# Patient Record
Sex: FEMALE | Race: BLACK OR AFRICAN AMERICAN | Employment: OTHER | URBAN - METROPOLITAN AREA
[De-identification: names, ages, dates, MRNs, and addresses within clinical notes are randomized per-mention and may not be internally consistent; named-entity substitution may affect disease eponyms.]

---

## 2018-09-12 ENCOUNTER — ED HISTORICAL/CONVERTED ENCOUNTER (OUTPATIENT)
Dept: OTHER | Age: 78
End: 2018-09-12

## 2020-12-22 ENCOUNTER — HOSPITAL ENCOUNTER (EMERGENCY)
Age: 80
Discharge: HOME | End: 2020-12-22
Attending: EMERGENCY MEDICINE
Payer: MEDICAID

## 2020-12-22 VITALS
DIASTOLIC BLOOD PRESSURE: 52 MMHG | SYSTOLIC BLOOD PRESSURE: 140 MMHG | OXYGEN SATURATION: 99 % | WEIGHT: 170 LBS | HEART RATE: 65 BPM | RESPIRATION RATE: 16 BRPM | TEMPERATURE: 96.8 F

## 2020-12-22 DIAGNOSIS — E87.6 HYPOKALEMIA: ICD-10-CM

## 2020-12-22 DIAGNOSIS — E16.2 HYPOGLYCEMIA: Primary | ICD-10-CM

## 2020-12-22 LAB
ALBUMIN SERPL-MCNC: 3.7 G/DL (ref 3.5–5)
ALBUMIN/GLOB SERPL: 1.2 {RATIO} (ref 1.1–2.2)
ALP SERPL-CCNC: 74 U/L (ref 45–117)
ALT SERPL-CCNC: 15 U/L (ref 12–78)
ANION GAP SERPL CALC-SCNC: 4 MMOL/L (ref 5–15)
AST SERPL-CCNC: 19 U/L (ref 15–37)
BASOPHILS # BLD: 0.1 K/UL (ref 0–0.1)
BASOPHILS NFR BLD: 1 % (ref 0–1)
BILIRUB SERPL-MCNC: 0.2 MG/DL (ref 0.2–1)
BUN SERPL-MCNC: 16 MG/DL (ref 6–20)
BUN/CREAT SERPL: 15 (ref 12–20)
CALCIUM SERPL-MCNC: 9 MG/DL (ref 8.5–10.1)
CHLORIDE SERPL-SCNC: 114 MMOL/L (ref 97–108)
CO2 SERPL-SCNC: 25 MMOL/L (ref 21–32)
COMMENT, HOLDF: NORMAL
CREAT SERPL-MCNC: 1.07 MG/DL (ref 0.55–1.02)
DIFFERENTIAL METHOD BLD: ABNORMAL
EOSINOPHIL # BLD: 0.1 K/UL (ref 0–0.4)
EOSINOPHIL NFR BLD: 2 % (ref 0–7)
ERYTHROCYTE [DISTWIDTH] IN BLOOD BY AUTOMATED COUNT: 13.7 % (ref 11.5–14.5)
GLOBULIN SER CALC-MCNC: 3.2 G/DL (ref 2–4)
GLUCOSE BLD STRIP.AUTO-MCNC: 49 MG/DL (ref 65–100)
GLUCOSE BLD STRIP.AUTO-MCNC: 50 MG/DL (ref 65–100)
GLUCOSE BLD STRIP.AUTO-MCNC: 67 MG/DL (ref 65–100)
GLUCOSE BLD STRIP.AUTO-MCNC: 81 MG/DL (ref 65–100)
GLUCOSE BLD STRIP.AUTO-MCNC: 84 MG/DL (ref 65–100)
GLUCOSE BLD STRIP.AUTO-MCNC: 93 MG/DL (ref 65–100)
GLUCOSE SERPL-MCNC: 42 MG/DL (ref 65–100)
HCT VFR BLD AUTO: 37.8 % (ref 35–47)
HGB BLD-MCNC: 12.2 G/DL (ref 11.5–16)
IMM GRANULOCYTES # BLD AUTO: 0.1 K/UL (ref 0–0.04)
IMM GRANULOCYTES NFR BLD AUTO: 1 % (ref 0–0.5)
LYMPHOCYTES # BLD: 2.9 K/UL (ref 0.8–3.5)
LYMPHOCYTES NFR BLD: 31 % (ref 12–49)
MCH RBC QN AUTO: 27.4 PG (ref 26–34)
MCHC RBC AUTO-ENTMCNC: 32.3 G/DL (ref 30–36.5)
MCV RBC AUTO: 84.9 FL (ref 80–99)
MONOCYTES # BLD: 0.8 K/UL (ref 0–1)
MONOCYTES NFR BLD: 8 % (ref 5–13)
NEUTS SEG # BLD: 5.4 K/UL (ref 1.8–8)
NEUTS SEG NFR BLD: 57 % (ref 32–75)
NRBC # BLD: 0 K/UL (ref 0–0.01)
NRBC BLD-RTO: 0 PER 100 WBC
PLATELET # BLD AUTO: 221 K/UL (ref 150–400)
PMV BLD AUTO: 9.5 FL (ref 8.9–12.9)
POTASSIUM SERPL-SCNC: 3.2 MMOL/L (ref 3.5–5.1)
PROT SERPL-MCNC: 6.9 G/DL (ref 6.4–8.2)
RBC # BLD AUTO: 4.45 M/UL (ref 3.8–5.2)
SAMPLES BEING HELD,HOLD: NORMAL
SERVICE CMNT-IMP: ABNORMAL
SERVICE CMNT-IMP: ABNORMAL
SERVICE CMNT-IMP: NORMAL
SODIUM SERPL-SCNC: 143 MMOL/L (ref 136–145)
WBC # BLD AUTO: 9.3 K/UL (ref 3.6–11)

## 2020-12-22 PROCEDURE — 85025 COMPLETE CBC W/AUTO DIFF WBC: CPT

## 2020-12-22 PROCEDURE — 74011250637 HC RX REV CODE- 250/637: Performed by: EMERGENCY MEDICINE

## 2020-12-22 PROCEDURE — 82962 GLUCOSE BLOOD TEST: CPT

## 2020-12-22 PROCEDURE — 36415 COLL VENOUS BLD VENIPUNCTURE: CPT

## 2020-12-22 PROCEDURE — 99285 EMERGENCY DEPT VISIT HI MDM: CPT

## 2020-12-22 PROCEDURE — 80053 COMPREHEN METABOLIC PANEL: CPT

## 2020-12-22 RX ORDER — GLIPIZIDE 10 MG/1
TABLET ORAL
COMMUNITY
End: 2021-03-08

## 2020-12-22 RX ORDER — POTASSIUM CHLORIDE 750 MG/1
40 TABLET, FILM COATED, EXTENDED RELEASE ORAL
Status: COMPLETED | OUTPATIENT
Start: 2020-12-22 | End: 2020-12-22

## 2020-12-22 RX ADMIN — POTASSIUM CHLORIDE 40 MEQ: 750 TABLET, EXTENDED RELEASE ORAL at 21:48

## 2020-12-22 NOTE — ED PROVIDER NOTES
72-year-old female history of diabetes presents to the emergency department today with chief complaint of low blood sugar. She typically runs in the 140s to 150s range. This morning her blood sugar was in the 40s and she came to the emergency department for further work-up. She feels generalized weakness. She endorses no changes in her diabetes medications. She tells me that she still is eating well without missing meals. She denies any recent illness with cough or fever or chest pain or shortness of breath. She has a history of cardiovascular disease and uses nitroglycerin daily. She has been drinking Coke at home and try to eat today to bring her blood sugar up. The history is provided by the patient and medical records. Low Blood Sugar   This is a new problem. Episode onset: This morning. The problem has not changed since onset. Associated symptoms include weakness. Pertinent negatives include no confusion, no somnolence, no seizures, no unresponsiveness, no agitation, no delusions, no hallucinations, no self-injury, no violence, no tingling and no numbness. No past medical history on file. No past surgical history on file. No family history on file.     Social History     Socioeconomic History    Marital status: Not on file     Spouse name: Not on file    Number of children: Not on file    Years of education: Not on file    Highest education level: Not on file   Occupational History    Not on file   Social Needs    Financial resource strain: Not on file    Food insecurity     Worry: Not on file     Inability: Not on file    Transportation needs     Medical: Not on file     Non-medical: Not on file   Tobacco Use    Smoking status: Not on file   Substance and Sexual Activity    Alcohol use: Not on file    Drug use: Not on file    Sexual activity: Not on file   Lifestyle    Physical activity     Days per week: Not on file     Minutes per session: Not on file    Stress: Not on file   Relationships    Social connections     Talks on phone: Not on file     Gets together: Not on file     Attends Worship service: Not on file     Active member of club or organization: Not on file     Attends meetings of clubs or organizations: Not on file     Relationship status: Not on file    Intimate partner violence     Fear of current or ex partner: Not on file     Emotionally abused: Not on file     Physically abused: Not on file     Forced sexual activity: Not on file   Other Topics Concern    Not on file   Social History Narrative    Not on file         ALLERGIES: Patient has no known allergies. Review of Systems   Constitutional: Positive for fatigue. Negative for fever. HENT: Negative for sneezing and sore throat. Respiratory: Negative for cough and shortness of breath. Cardiovascular: Negative for chest pain and leg swelling. Gastrointestinal: Negative for abdominal pain, diarrhea, nausea and vomiting. Genitourinary: Negative for difficulty urinating and dysuria. Musculoskeletal: Negative for arthralgias and myalgias. Skin: Negative for color change and rash. Neurological: Positive for weakness. Negative for tingling, seizures, numbness and headaches. Psychiatric/Behavioral: Negative for agitation, behavioral problems, confusion, hallucinations and self-injury. Vitals:    12/22/20 1812   BP: 132/66   Pulse: 65   Resp: 16   Temp: 96.8 °F (36 °C)   SpO2: 99%   Weight: 77.1 kg (170 lb)            Physical Exam  Vitals signs and nursing note reviewed. Constitutional:       General: She is not in acute distress. Appearance: Normal appearance. She is not ill-appearing, toxic-appearing or diaphoretic. HENT:      Head: Normocephalic and atraumatic. Nose: Nose normal.      Mouth/Throat:      Mouth: Mucous membranes are moist.      Pharynx: Oropharynx is clear. Eyes:      Extraocular Movements: Extraocular movements intact.       Conjunctiva/sclera: Conjunctivae normal.      Pupils: Pupils are equal, round, and reactive to light. Neck:      Musculoskeletal: Normal range of motion and neck supple. Cardiovascular:      Rate and Rhythm: Normal rate and regular rhythm. Pulses: Normal pulses. Heart sounds: Normal heart sounds. Pulmonary:      Effort: Pulmonary effort is normal. No respiratory distress. Breath sounds: Normal breath sounds. Abdominal:      General: There is no distension. Palpations: Abdomen is soft. Tenderness: There is no abdominal tenderness. There is no guarding or rebound. Musculoskeletal: Normal range of motion. General: No swelling, tenderness, deformity or signs of injury. Right lower leg: No edema. Left lower leg: No edema. Skin:     General: Skin is warm and dry. Capillary Refill: Capillary refill takes less than 2 seconds. Neurological:      General: No focal deficit present. Mental Status: She is alert and oriented to person, place, and time. Psychiatric:         Mood and Affect: Mood normal.         Behavior: Behavior normal.          MDM  Number of Diagnoses or Management Options  Diagnosis management comments: 51-year-old female on glipizide for diabetes presents to the emergency department as above with hypoglycemia. This corrected with p.o. glucose as well as eating a meal.  She does not have evidence of renal dysfunction or other reason for her hypoglycemia. After observation in the emergency department with multiple glucose checks her sugar stabilized and she was discharged home with instructions to follow-up with her primary care doctor.        Amount and/or Complexity of Data Reviewed  Clinical lab tests: reviewed           Procedures

## 2020-12-22 NOTE — ED TRIAGE NOTES
Low blood sugar this am at home. 51 in triage given 8 oz juice. Patient reports feeling ok and eating.

## 2020-12-22 NOTE — ED NOTES
6:13 PM  I have evaluated the patient as the Provider in Triage. I have reviewed Her vital signs and the triage nurse assessment. I have talked with the patient and any available family and advised that I am the provider in triage and have ordered the appropriate study to initiate their work up based on the clinical presentation during my assessment. I have advised that the patient will be accommodated in the Main ED as soon as possible. I have also requested to contact the triage nurse or myself immediately if the patient experiences any changes in their condition during this brief waiting period. 79-year-old female with noninsulin-dependent diabetes sent in the ED for low at home blood sugars. On she had to eat today was an egg salad sandwich and a Coca-Cola. Daily sugars are typically in the 150s, today at home it was 41. Glucose in triage was 49, patient given orange juice to drink. She denies dizziness, abdominal pain, nausea, or any medical complaints at this time.     HARSHAL Le

## 2020-12-22 NOTE — ED NOTES
Rechecked blood sugar. Result of 50. Patient alert and oriented. Asymptomatic. Patient given 8oz of orange juice. Will recheck sugar in 15 minutes.

## 2020-12-23 NOTE — ED NOTES
Report received from Shyanne moreno, [facility].  Assumed care of pt at this time

## 2020-12-23 NOTE — DISCHARGE INSTRUCTIONS
Please check your blood glucose as needed for any symptoms of hyper or hypoglycemia. Please make an appointment as soon as possible see your primary care doctor. Be sure to eat nutritious meals 3 times daily along with healthy snacks as needed.

## 2021-01-10 NOTE — PROGRESS NOTES
Angelique Smith  68 y.o. female  1940  [address] [facility] [address]  [id number]   [address]:    Telemedicine Progress Note  Maddison Vazquez MD       Encounter Date and Time: January 11, 2021 at 4:41 PM    Consent: Angelique Smith, who was seen by synchronous (real-time) audio-video technology, and/or her healthcare decision maker, is aware that this patient-initiated, Telehealth encounter on 1/11/2021 is a billable service, with coverage as determined by her insurance carrier. She is aware that she may receive a bill and has provided verbal consent to proceed: Yes. Chief Complaint   Patient presents with    Diabetes     History of Present Illness   Angelique Smith is a 68 y.o. female was evaluated by synchronous (real-time) audio-video technology from home, through a secure patient portal.    DM:  Was in the ER for low blood sugar. 12/22/20 went to [facility]. BS was 42. This was corrected by PO glucose. BS this AM was 147. Not sure what last A1C was. States her A1C has been good, but not sure what it was. Not on Metformin because she has some GI side effects and stomach aching. Has been on Januvia for about 2 years. Has appt with ophthalmologist coming up in March. Noted to have low potassium as well in ER. Hx of triple bypass, multiple caths. Requesting referral to Cardiology in our [employer] system. Normal -120/80. This morning a little high and states she is anxious that this is her first virtual call.      Review of Systems   ROS    Vitals/Objective:     General: alert, cooperative, no distress   Mental  status: mental status: alert, oriented to person, place, and time, normal mood, behavior, speech, dress, motor activity, and thought processes   Resp: resp: normal effort and no respiratory distress   Neuro: neuro: no gross deficits   Skin: skin: no discoloration or lesions of concern on visible areas   Due to this being a TeleHealth evaluation, many elements of the physical examination are unable to be assessed. Assessment and Plan:   Time-based coding, delete if not needed: I spent at least 40 minutes with this established patient, and >50% of the time was spent counseling and/or coordinating care regarding diabetes    1. Hypokalemia  - METABOLIC PANEL, COMPREHENSIVE; Future    2. Hypoglycemia  - METABOLIC PANEL, COMPREHENSIVE; Future    3. Elevated serum creatinine  - METABOLIC PANEL, COMPREHENSIVE; Future  - HEMOGLOBIN A1C WITH EAG; Future    4. Type 2 diabetes mellitus with other specified complication, unspecified whether long term insulin use (HCC)  - METABOLIC PANEL, COMPREHENSIVE; Future  - LIPID PANEL; Future  - HEMOGLOBIN A1C WITH EAG; Future  - REFERRAL TO OPHTHALMOLOGY  - MICROALBUMIN, UR, RAND W/ MICROALB/CREAT RATIO; Future    5. Coronary artery disease involving other coronary artery bypass graft without angina pectoris  - REFERRAL TO CARDIOLOGY    6. Secondary hypertension      Assessment/Plan: Hold glipizide for now until we can check A1C. Will call repeat lab. Will call [facility] to get prior records. Check your BP at home everyday and can send me a her home BPs by the end of the week. Will also refer to Cardiology given hx and to establish care.       Patient Screening for COVID-19:     1) Patient denies complaints of shortness of breath or difficulty breathing, sore throat, chills, fatigue, muscle aches, loss of taste or smell, or GI symptoms(nausea, vomiting or diarrhea): Yes    2) Patient denies complaints of cough or fever over 100F: Yes    3) Patient denies leaving the country in the past 14 days or any other recent travel: Yes    4) Patient denies being exposed to anyone with COVID-19 and has not been around any one that has been sick with COVID-19 like symptoms as listed above: Yes     5) Patient informed to wear mask when arriving for appointment: Yes        Time spent in direct conversation with the patient to include medical condition(s) discussed, assessment and treatment plan:       We discussed the expected course, resolution and complications of the diagnosis(es) in detail. Medication risks, benefits, costs, interactions, and alternatives were discussed as indicated. I advised her to contact the office if her condition worsens, changes or fails to improve as anticipated. She expressed understanding with the diagnosis(es) and plan. Patient understands that this encounter was a temporary measure, and the importance of further follow up and examination was emphasized. Patient verbalized understanding. Patient informed to follow up: Yessenia Neff Follow-up and Dispositions  ·   Return for Come in this week for labs and then follow up in 2 month for Medicare Wellness(has BP cuff at home). Electronically Signed: Haroon Motley MD    CPT Codes 07420-85318 for Established Patients may apply to this Telehealth Visit. POS code: 18. Modifier GT    Sekou Balderas is a 68 y.o. female who was evaluated by an audio-video encounter for concerns as above. Patient identification was verified prior to start of the visit. A caregiver was present when appropriate. Due to this being a TeleHealth encounter (During [place] [address] public health emergency), evaluation of the following organ systems was limited: Vitals/Constitutional/EENT/Resp/CV/GI//MS/Neuro/Skin/Heme-Lymph-Imm. Pursuant to the emergency declaration under the [facility], [address] waiver authority and the [employer] and [employer] General Act, this Virtual Visit was conducted, with patient's (and/or legal guardian's) consent, to reduce the patient's risk of exposure to COVID-19 and provide necessary medical care. Services were provided through a synchronous discussion virtually to substitute for in-person clinic visit. I was at home. The patient was at home.      History   Patients past medical, surgical and family histories were reviewed and updated. Past Medical History:   Diagnosis Date    Diabetes ([facility] [address]     Endocrine disease     Hypertension     S/P triple vessel bypass 2005     No past surgical history on file. No family history on file. Social History     Socioeconomic History    Marital status:      Spouse name: Not on file    Number of children: Not on file    Years of education: Not on file    Highest education level: Not on file   Occupational History    Not on file   Social Needs    Financial resource strain: Not on file    Food insecurity     Worry: Not on file     Inability: Not on file    Transportation needs     Medical: Not on file     Non-medical: Not on file   Tobacco Use    Smoking status: Not on file   Substance and Sexual Activity    Alcohol use: Not on file    Drug use: Not on file    Sexual activity: Not on file   Lifestyle    Physical activity     Days per week: Not on file     Minutes per session: Not on file    Stress: Not on file   Relationships    Social connections     Talks on phone: Not on file     Gets together: Not on file     Attends Taoism service: Not on file     Active member of club or organization: Not on file     Attends meetings of clubs or organizations: Not on file     Relationship status: Not on file    Intimate partner violence     Fear of current or ex partner: Not on file     Emotionally abused: Not on file     Physically abused: Not on file     Forced sexual activity: Not on file   Other Topics Concern    Not on file   Social History Narrative    Not on file     There is no problem list on file for this patient.          Current Medications/Allergies   Medications and Allergies reviewed:    Current Outpatient Medications   Medication Sig Dispense Refill    isosorbide mononitrate ER (IMDUR) 60 mg CR tablet 60 mg = 1 tab each dose, PO, daily, # 90 tab, 3 Refills, Pharmacy: [facility] [id number]      omeprazole (PRILOSEC) 20 mg capsule 20 mg = 1 CAP each dose, PO, daily, # 90 CAP, 3 Refills, Pharmacy: [facility] [address]      doxazosin (CARDURA) 1 mg tablet TAKE 1 TABLET BY MOUTH ONCE DAILY      isosorbide mononitrate ER (IMDUR) 60 mg CR tablet TAKE 1 TABLET BY MOUTH ONCE DAILY      rosuvastatin (CRESTOR) 40 mg tablet TAKE 1 TABLET BY MOUTH ONCE DAILY      Januvia 100 mg tablet TAKE 1 TABLET BY MOUTH ONCE DAILY      glipiZIDE (GLUCOTROL) 10 mg tablet Take  by mouth.  Unknown dose       No Known Allergies

## 2021-01-11 ENCOUNTER — VIRTUAL VISIT (OUTPATIENT)
Dept: FAMILY MEDICINE CLINIC | Age: 81
End: 2021-01-11
Payer: MEDICARE

## 2021-01-11 DIAGNOSIS — R79.89 ELEVATED SERUM CREATININE: ICD-10-CM

## 2021-01-11 DIAGNOSIS — E11.69 TYPE 2 DIABETES MELLITUS WITH OTHER SPECIFIED COMPLICATION, UNSPECIFIED WHETHER LONG TERM INSULIN USE (HCC): ICD-10-CM

## 2021-01-11 DIAGNOSIS — I25.810 CORONARY ARTERY DISEASE INVOLVING OTHER CORONARY ARTERY BYPASS GRAFT WITHOUT ANGINA PECTORIS: ICD-10-CM

## 2021-01-11 DIAGNOSIS — E87.6 HYPOKALEMIA: Primary | ICD-10-CM

## 2021-01-11 DIAGNOSIS — I15.9 SECONDARY HYPERTENSION: ICD-10-CM

## 2021-01-11 DIAGNOSIS — E16.2 HYPOGLYCEMIA: ICD-10-CM

## 2021-01-11 PROCEDURE — 99203 OFFICE O/P NEW LOW 30 MIN: CPT | Performed by: FAMILY MEDICINE

## 2021-01-11 RX ORDER — ISOSORBIDE MONONITRATE 60 MG/1
TABLET, EXTENDED RELEASE ORAL
COMMUNITY
Start: 2020-10-30 | End: 2021-01-25 | Stop reason: SDUPTHER

## 2021-01-11 RX ORDER — ROSUVASTATIN CALCIUM 40 MG/1
TABLET, COATED ORAL
COMMUNITY
Start: 2020-10-30 | End: 2021-01-25 | Stop reason: SDUPTHER

## 2021-01-11 RX ORDER — ISOSORBIDE MONONITRATE 60 MG/1
TABLET, EXTENDED RELEASE ORAL
COMMUNITY
Start: 2020-02-04 | End: 2021-01-25 | Stop reason: SDUPTHER

## 2021-01-11 RX ORDER — DOXAZOSIN 1 MG/1
TABLET ORAL
COMMUNITY
Start: 2020-11-16 | End: 2021-01-25 | Stop reason: SDUPTHER

## 2021-01-11 RX ORDER — SITAGLIPTIN 100 MG/1
TABLET, FILM COATED ORAL
COMMUNITY
Start: 2020-11-29 | End: 2021-01-14 | Stop reason: SDUPTHER

## 2021-01-11 RX ORDER — OMEPRAZOLE 20 MG/1
CAPSULE, DELAYED RELEASE ORAL
COMMUNITY
Start: 2020-02-04 | End: 2021-01-25 | Stop reason: SDUPTHER

## 2021-01-14 ENCOUNTER — TELEPHONE (OUTPATIENT)
Dept: FAMILY MEDICINE CLINIC | Age: 81
End: 2021-01-14

## 2021-01-14 NOTE — TELEPHONE ENCOUNTER
Patient was not inquiring about lab results. She wanted to schedule an appointment to get labs drawn. Appointment scheduled for tomorrow morning.

## 2021-01-14 NOTE — TELEPHONE ENCOUNTER
----- Message from Berl Model sent at 1/13/2021  5:00 PM EST -----  Regarding: /Refill  Contact: [phone]  Medication Refill    Caller (if not patient): N/A      Relationship of caller (if not patient): N/A      Best contact number(s): [phone]      Name of medication and dosage if known: Blood pressure medication x4, amlodipine, mosipril (?), genuva(?), cholesterol med, omeprazole 10mg, doxyicine(?) 1mg, atorvastatin, nitroglycerine (under the tongue pill) 0.4mg and patches 0.4mg\"      Is patient out of this medication (yes/no): Yes      Pharm[email or web address] listed in chart? (yes/no): Yes  Pharmacy phone number: [phone]      Details to clarify the request: pt stated she takes 8 medications. She stated that she threw away the bottles and she is not exactly sure of the names of her prescriptions, but that she needs them refilled.       [place]

## 2021-01-14 NOTE — TELEPHONE ENCOUNTER
----- Message from Edilberto Mederos sent at 1/13/2021  4:58 PM EST -----  Regarding: /Telephone  Contact: [phone]  General Message/Vendor Calls    Caller's first and last name:N/A      Reason for call: lab results from last year      Callback required yes/no and why:Yes, to discuss results. Best contact number(s):[phone]      Details to clarify the request: pt stated that she never received her lab results from last year and she would like to know what those results are. Pt could not remember the exact date, but stated it was \"2019 last year\".            Edilberto Mederos

## 2021-01-15 ENCOUNTER — LAB ONLY (OUTPATIENT)
Dept: FAMILY MEDICINE CLINIC | Age: 81
End: 2021-01-15

## 2021-01-15 DIAGNOSIS — E87.6 HYPOKALEMIA: ICD-10-CM

## 2021-01-15 DIAGNOSIS — E11.69 TYPE 2 DIABETES MELLITUS WITH OTHER SPECIFIED COMPLICATION, UNSPECIFIED WHETHER LONG TERM INSULIN USE (HCC): ICD-10-CM

## 2021-01-15 DIAGNOSIS — E16.2 HYPOGLYCEMIA: ICD-10-CM

## 2021-01-15 DIAGNOSIS — R79.89 ELEVATED SERUM CREATININE: ICD-10-CM

## 2021-01-15 RX ORDER — OMEPRAZOLE 20 MG/1
CAPSULE, DELAYED RELEASE ORAL
OUTPATIENT
Start: 2021-01-15

## 2021-01-15 RX ORDER — SITAGLIPTIN 100 MG/1
TABLET, FILM COATED ORAL
Qty: 30 TAB | Refills: 0 | Status: SHIPPED | OUTPATIENT
Start: 2021-01-15 | End: 2021-01-25 | Stop reason: SDUPTHER

## 2021-01-15 NOTE — TELEPHONE ENCOUNTER
Patient needs to come in for labs. Please help make an appt. 30 day supply given for Januvia. Needs appt in 3-4 days after labs collected to discuss diabetes and other concerns. This can be done virtually. Did not discuss omeprazole at appt. Need to discuss this medication prior to refill.

## 2021-01-16 LAB
ALBUMIN SERPL-MCNC: 3.8 G/DL (ref 3.5–5)
ALBUMIN/GLOB SERPL: 1.1 {RATIO} (ref 1.1–2.2)
ALP SERPL-CCNC: 96 U/L (ref 45–117)
ALT SERPL-CCNC: 18 U/L (ref 12–78)
ANION GAP SERPL CALC-SCNC: 7 MMOL/L (ref 5–15)
AST SERPL-CCNC: 16 U/L (ref 15–37)
BILIRUB SERPL-MCNC: 0.3 MG/DL (ref 0.2–1)
BUN SERPL-MCNC: 16 MG/DL (ref 6–20)
BUN/CREAT SERPL: 19 (ref 12–20)
CALCIUM SERPL-MCNC: 9.7 MG/DL (ref 8.5–10.1)
CHLORIDE SERPL-SCNC: 109 MMOL/L (ref 97–108)
CHOLEST SERPL-MCNC: 149 MG/DL
CO2 SERPL-SCNC: 25 MMOL/L (ref 21–32)
CREAT SERPL-MCNC: 0.85 MG/DL (ref 0.55–1.02)
CREAT UR-MCNC: 56 MG/DL
EST. AVERAGE GLUCOSE BLD GHB EST-MCNC: 160 MG/DL
GLOBULIN SER CALC-MCNC: 3.5 G/DL (ref 2–4)
GLUCOSE SERPL-MCNC: 196 MG/DL (ref 65–100)
HBA1C MFR BLD: 7.2 % (ref 4–5.6)
HDLC SERPL-MCNC: 53 MG/DL
HDLC SERPL: 2.8 {RATIO} (ref 0–5)
LDLC SERPL CALC-MCNC: 83.8 MG/DL (ref 0–100)
LIPID PROFILE,FLP: NORMAL
MICROALBUMIN UR-MCNC: 2.56 MG/DL
MICROALBUMIN/CREAT UR-RTO: 46 MG/G (ref 0–30)
POTASSIUM SERPL-SCNC: 4.4 MMOL/L (ref 3.5–5.1)
PROT SERPL-MCNC: 7.3 G/DL (ref 6.4–8.2)
SODIUM SERPL-SCNC: 141 MMOL/L (ref 136–145)
TRIGL SERPL-MCNC: 61 MG/DL (ref ?–150)
VLDLC SERPL CALC-MCNC: 12.2 MG/DL

## 2021-01-16 NOTE — PROGRESS NOTES
Your A1C is in good range. Please continue to hold you glipizide. You may not need this medication moving forward. You have some protein in your urine and I recommend medication like losartan or lisinopril to protect your kidneys. Please call to schedule an appt virtually to discuss options and let's recheck your A1C in 3-4 months to see how you are doing off of the glipizide.

## 2021-01-25 ENCOUNTER — TELEPHONE (OUTPATIENT)
Dept: FAMILY MEDICINE CLINIC | Age: 81
End: 2021-01-25

## 2021-01-25 DIAGNOSIS — I25.810 CORONARY ARTERY DISEASE INVOLVING OTHER CORONARY ARTERY BYPASS GRAFT WITHOUT ANGINA PECTORIS: ICD-10-CM

## 2021-01-25 DIAGNOSIS — I15.9 SECONDARY HYPERTENSION: ICD-10-CM

## 2021-01-25 DIAGNOSIS — E11.69 TYPE 2 DIABETES MELLITUS WITH OTHER SPECIFIED COMPLICATION, UNSPECIFIED WHETHER LONG TERM INSULIN USE (HCC): Primary | ICD-10-CM

## 2021-01-25 RX ORDER — ROSUVASTATIN CALCIUM 40 MG/1
TABLET, COATED ORAL
Qty: 60 TAB | Refills: 0 | Status: SHIPPED | OUTPATIENT
Start: 2021-01-25 | End: 2022-04-26 | Stop reason: SDUPTHER

## 2021-01-25 RX ORDER — SITAGLIPTIN 100 MG/1
TABLET, FILM COATED ORAL
Qty: 30 TAB | Refills: 0 | Status: SHIPPED | OUTPATIENT
Start: 2021-01-25 | End: 2021-03-08

## 2021-01-25 RX ORDER — ISOSORBIDE MONONITRATE 60 MG/1
TABLET, EXTENDED RELEASE ORAL
Qty: 30 TAB | Refills: 0 | Status: SHIPPED | OUTPATIENT
Start: 2021-01-25

## 2021-01-25 RX ORDER — OMEPRAZOLE 20 MG/1
CAPSULE, DELAYED RELEASE ORAL
Qty: 60 CAP | Refills: 0 | Status: SHIPPED | OUTPATIENT
Start: 2021-01-25 | End: 2022-01-08

## 2021-01-25 RX ORDER — GLIPIZIDE 10 MG/1
TABLET ORAL
Status: CANCELLED | OUTPATIENT
Start: 2021-01-25

## 2021-01-25 RX ORDER — DOXAZOSIN 1 MG/1
TABLET ORAL
Qty: 30 TAB | Refills: 0 | Status: SHIPPED | OUTPATIENT
Start: 2021-01-25 | End: 2021-09-23

## 2021-01-25 NOTE — TELEPHONE ENCOUNTER
----- Message from Kirsten Colon sent at 1/21/2021 12:25 PM EST -----  Regarding: /Refill    Medication Refill    Caller (if not patient): Self      Relationship of caller (if not patient): Self      Best contact number(s):[phone]      Name of medication and dosage if known: isosorbide mononitrate ER (IMDUR) 60 mg CR tablet,      Is patient out of this medication (yes/no): Yes      Pharmacy name: [place] [id number] listed in chart? (yes/no):Yes  Pharmacy phone number: [phone]      Details to clarify the request: Pt did not have a list of all other medications she needs. Pt misplaced her medication bag. Pt advised she needs new refills sent to [facility] for all her emergency.        Kirsten Colon

## 2021-01-26 NOTE — ADDENDUM NOTE
Addended by: Angeles Aguila on: 1/25/2021 08:11 PM     Modules accepted: Orders
Addended by: Bj Rick on: 1/25/2021 01:14 PM     Modules accepted: Orders
Detail Level: Zone

## 2021-01-28 NOTE — TELEPHONE ENCOUNTER
Message left on patient's VMB in regards to Dr. Jasper Maya message below. Told her to call back clinic to discuss.

## 2021-01-29 NOTE — TELEPHONE ENCOUNTER
Patient scheduled for:   Appointment Information   Name: Bibi Vasquez MRN: [id number]    Date: 2/8/2021 Status: [place]    Time: 10:15 AM Length: 27 [id number]   Visit Type: [facility] [[id number]] Copay: $0.00    Provider: Cyndie Power MD Department: [facility]    Referral #:   Referral Status:      Referring Provider:   Patient Type:      Notes: fuv to labs/med refills/STATE-[facility]/MY-CHART      Close enc    Thanks  [facility] S[facility]  [facility] Referral Coordinator

## 2021-02-08 ENCOUNTER — VIRTUAL VISIT (OUTPATIENT)
Dept: FAMILY MEDICINE CLINIC | Age: 81
End: 2021-02-08
Payer: MEDICARE

## 2021-02-08 DIAGNOSIS — I25.810 CORONARY ARTERY DISEASE INVOLVING OTHER CORONARY ARTERY BYPASS GRAFT WITHOUT ANGINA PECTORIS: ICD-10-CM

## 2021-02-08 DIAGNOSIS — E11.69 TYPE 2 DIABETES MELLITUS WITH OTHER SPECIFIED COMPLICATION, UNSPECIFIED WHETHER LONG TERM INSULIN USE (HCC): Primary | ICD-10-CM

## 2021-02-08 PROCEDURE — 3051F HG A1C>EQUAL 7.0%<8.0%: CPT | Performed by: FAMILY MEDICINE

## 2021-02-08 PROCEDURE — 99214 OFFICE O/P EST MOD 30 MIN: CPT | Performed by: FAMILY MEDICINE

## 2021-02-08 RX ORDER — AMLODIPINE BESYLATE 10 MG/1
10 TABLET ORAL DAILY
COMMUNITY
Start: 2021-01-22

## 2021-02-08 RX ORDER — FOSINOPIRL SODIUM 10 MG/1
10 TABLET ORAL DAILY
Qty: 90 TAB | Refills: 1 | Status: SHIPPED | OUTPATIENT
Start: 2021-02-08 | End: 2022-01-08

## 2021-02-08 NOTE — PROGRESS NOTES
Jayson Chan  68 y.o. female  1940  [address] [facility] [address]  [id number]   [address]:    Telemedicine Progress Note  Ila Gonazles MD       Encounter Date and Time: February 8, 2021 at 10:32 AM    Consent: Jayson Chan, who was seen by synchronous (real-time) audio-video technology, and/or her healthcare decision maker, is aware that this patient-initiated, Telehealth encounter on 2/8/2021 is a billable service, with coverage as determined by her insurance carrier. She is aware that she may receive a bill and has provided verbal consent to proceed: Yes. Chief Complaint   Patient presents with    Diabetes     History of Present Illness   Jayson Chan is a 68 y.o. female was evaluated by synchronous (real-time) audio-video technology from home, through a secure patient portal.    DM:  Check BS at home. 150-160s and up to 200s after meals. Usually takes Fosinopril and has been out of this for a week. HTN:  Was taking an OTC supplement and not sure what it is but states once she stopped this, her BPs improved.       Lab Results   Component Value Date/Time    Hemoglobin A1c 7.2 (H) 01/15/2021 09:19 AM    Microalbumin/Creat ratio (mg/g creat) 46 (H) 01/15/2021 03:08 PM    Microalbumin,urine random 2.56 01/15/2021 03:08 PM    LDL, calculated 83.8 01/15/2021 09:19 AM    Creatinine 0.85 01/15/2021 09:19 AM         POC Glucose last 24hrs:   No components found for: GLPOC      Review of Systems   ROS    Vitals/Objective:     General: alert, cooperative, no distress   Mental  status: mental status: alert, oriented to person, place, and time, normal mood, behavior, speech, dress, motor activity, and thought processes   Resp: resp: normal effort and no respiratory distress   Neuro: neuro: no gross deficits   Skin: skin: no discoloration or lesions of concern on visible areas   Due to this being a TeleHealth evaluation, many elements of the physical examination are unable to be assessed. Assessment and Plan:   Time-based coding, delete if not needed: I spent at least 25 minutes with this established patient, and >50% of the time was spent counseling and/or coordinating care regarding DM and medication change    1. Type 2 diabetes mellitus with other specified complication, unspecified whether long term insulin use (HCC)  - HEMOGLOBIN A1C WITH EAG; Future  - empagliflozin (JARDIANCE) 10 mg tablet; Take 1 Tab by mouth daily. Dispense: 90 Tab; Refill: 1      Assessment/Plan: Having issues with insurance with Jardiance. Given co-morbilities, will start Jardiance and recheck A1C in 3 months. Time spent in direct conversation with the patient to include medical condition(s) discussed, assessment and treatment plan:       We discussed the expected course, resolution and complications of the diagnosis(es) in detail. Medication risks, benefits, costs, interactions, and alternatives were discussed as indicated. I advised her to contact the office if her condition worsens, changes or fails to improve as anticipated. She expressed understanding with the diagnosis(es) and plan. Patient understands that this encounter was a temporary measure, and the importance of further follow up and examination was emphasized. Patient verbalized understanding. Patient informed to follow up: Kenny Bliss Follow-up and Dispositions  ·   Return in about 3 months (around 5/8/2021) for Medicare Wellness . Electronically Signed: Arcadio Butler MD    CPT Codes 91731-27229 for Established Patients may apply to this Telehealth Visit. POS code: 18. Modifier GT    Danielle Calderon is a 68 y.o. female who was evaluated by an audio-video encounter for concerns as above. Patient identification was verified prior to start of the visit. A caregiver was present when appropriate.  Due to this being a TeleHealth encounter (During EGK-85 public health emergency), evaluation of the following organ systems was limited: Vitals/Constitutional/EENT/Resp/CV/GI//MS/Neuro/Skin/Heme-Lymph-Imm. Pursuant to the emergency declaration under the [facility], [facility] waiver authority and the [employer] Act, this Virtual Visit was conducted, with patient's (and/or legal guardian's) consent, to reduce the patient's risk of exposure to COVID-19 and provide necessary medical care. Services were provided through a synchronous discussion virtually to substitute for in-person clinic visit. I was at home. The patient was at home. History   Patients past medical, surgical and family histories were reviewed and updated. Past Medical History:   Diagnosis Date    Diabetes ([facility] [address]     Endocrine disease     Hypertension     S/P triple vessel bypass 2005     No past surgical history on file. No family history on file. Social History     Tobacco Use    Smoking status: Not on file   Substance Use Topics    Alcohol use: Not on file    Drug use: Not on file     There is no problem list on file for this patient. Current Medications/Allergies   Medications and Allergies reviewed:    Current Outpatient Medications   Medication Sig Dispense Refill    fosinopriL (MONOPRIL) 10 mg tablet Take 1 Tab by mouth daily. 90 Tab 1    empagliflozin (JARDIANCE) 10 mg tablet Take 1 Tab by mouth daily.  90 Tab 1    amLODIPine (NORVASC) 10 mg tablet       doxazosin (CARDURA) 1 mg tablet TAKE 1 TABLET BY MOUTH ONCE DAILY 30 Tab 0    isosorbide mononitrate ER (IMDUR) 60 mg CR tablet 60 mg = 1 tab each dose, PO, daily, # 90 tab, 3 Refills, Pharmacy: [address] 30 Tab 0    Januvia 100 mg tablet TAKE 1 TABLET BY MOUTH ONCE DAILY 30 Tab 0    omeprazole (PRILOSEC) 20 mg capsule 20 mg = 1 CAP each dose, PO, daily, # 90 CAP, 3 Refills, Pharmacy: [address] 60 Cap 0    rosuvastatin (CRESTOR) 40 mg tablet TAKE 1 TABLET BY MOUTH ONCE DAILY 60 Tab 0    glipiZIDE (GLUCOTROL) 10 mg tablet Take  by mouth.  Unknown dose       No Known Allergies

## 2021-02-10 ENCOUNTER — TELEPHONE (OUTPATIENT)
Dept: FAMILY MEDICINE CLINIC | Age: 81
End: 2021-02-10

## 2021-02-10 ENCOUNTER — APPOINTMENT (OUTPATIENT)
Dept: GENERAL RADIOLOGY | Age: 81
End: 2021-02-10
Attending: EMERGENCY MEDICINE
Payer: COMMERCIAL

## 2021-02-10 ENCOUNTER — HOSPITAL ENCOUNTER (EMERGENCY)
Age: 81
Discharge: HOME | End: 2021-02-10
Attending: EMERGENCY MEDICINE | Admitting: EMERGENCY MEDICINE
Payer: COMMERCIAL

## 2021-02-10 VITALS
BODY MASS INDEX: 27.59 KG/M2 | HEART RATE: 72 BPM | SYSTOLIC BLOOD PRESSURE: 114 MMHG | WEIGHT: 161.6 LBS | DIASTOLIC BLOOD PRESSURE: 49 MMHG | RESPIRATION RATE: 18 BRPM | OXYGEN SATURATION: 96 % | TEMPERATURE: 96.9 F | HEIGHT: 64 IN

## 2021-02-10 DIAGNOSIS — R73.9 HYPERGLYCEMIA: ICD-10-CM

## 2021-02-10 DIAGNOSIS — R11.0 NAUSEA WITHOUT VOMITING: Primary | ICD-10-CM

## 2021-02-10 LAB
ALBUMIN SERPL-MCNC: 3.5 G/DL (ref 3.5–5)
ALBUMIN/GLOB SERPL: 0.9 {RATIO} (ref 1.1–2.2)
ALP SERPL-CCNC: 93 U/L (ref 45–117)
ALT SERPL-CCNC: 17 U/L (ref 12–78)
ANION GAP SERPL CALC-SCNC: 7 MMOL/L (ref 5–15)
APPEARANCE UR: CLEAR
AST SERPL-CCNC: 15 U/L (ref 15–37)
BACTERIA URNS QL MICRO: NEGATIVE /HPF
BASOPHILS # BLD: 0.1 K/UL (ref 0–0.1)
BASOPHILS NFR BLD: 1 % (ref 0–1)
BILIRUB SERPL-MCNC: 0.3 MG/DL (ref 0.2–1)
BILIRUB UR QL: NEGATIVE
BUN SERPL-MCNC: 17 MG/DL (ref 6–20)
BUN/CREAT SERPL: 16 (ref 12–20)
CALCIUM SERPL-MCNC: 9.1 MG/DL (ref 8.5–10.1)
CHLORIDE SERPL-SCNC: 102 MMOL/L (ref 97–108)
CO2 SERPL-SCNC: 26 MMOL/L (ref 21–32)
COLOR UR: ABNORMAL
COMMENT, HOLDF: NORMAL
COVID-19 RAPID TEST, COVR: NOT DETECTED
CREAT SERPL-MCNC: 1.06 MG/DL (ref 0.55–1.02)
CRP SERPL-MCNC: <0.29 MG/DL (ref 0–0.6)
DIFFERENTIAL METHOD BLD: NORMAL
EOSINOPHIL # BLD: 0.2 K/UL (ref 0–0.4)
EOSINOPHIL NFR BLD: 2 % (ref 0–7)
EPITH CASTS URNS QL MICRO: ABNORMAL /LPF
ERYTHROCYTE [DISTWIDTH] IN BLOOD BY AUTOMATED COUNT: 12.7 % (ref 11.5–14.5)
FERRITIN SERPL-MCNC: 61 NG/ML (ref 8–252)
FIBRINOGEN PPP-MCNC: 339 MG/DL (ref 200–475)
GLOBULIN SER CALC-MCNC: 3.7 G/DL (ref 2–4)
GLUCOSE BLD STRIP.AUTO-MCNC: 185 MG/DL (ref 65–100)
GLUCOSE SERPL-MCNC: 315 MG/DL (ref 65–100)
GLUCOSE UR STRIP.AUTO-MCNC: >1000 MG/DL
HCT VFR BLD AUTO: 39.8 % (ref 35–47)
HGB BLD-MCNC: 13.3 G/DL (ref 11.5–16)
HGB UR QL STRIP: NEGATIVE
HYALINE CASTS URNS QL MICRO: ABNORMAL /LPF (ref 0–5)
IMM GRANULOCYTES # BLD AUTO: 0 K/UL (ref 0–0.04)
IMM GRANULOCYTES NFR BLD AUTO: 0 % (ref 0–0.5)
KETONES UR QL STRIP.AUTO: NEGATIVE MG/DL
LACTATE SERPL-SCNC: 1.8 MMOL/L (ref 0.4–2)
LDH SERPL L TO P-CCNC: 173 U/L (ref 81–246)
LEUKOCYTE ESTERASE UR QL STRIP.AUTO: NEGATIVE
LIPASE SERPL-CCNC: 252 U/L (ref 73–393)
LYMPHOCYTES # BLD: 2.4 K/UL (ref 0.8–3.5)
LYMPHOCYTES NFR BLD: 23 % (ref 12–49)
MAGNESIUM SERPL-MCNC: 2.5 MG/DL (ref 1.6–2.4)
MCH RBC QN AUTO: 27.8 PG (ref 26–34)
MCHC RBC AUTO-ENTMCNC: 33.4 G/DL (ref 30–36.5)
MCV RBC AUTO: 83.1 FL (ref 80–99)
MONOCYTES # BLD: 0.7 K/UL (ref 0–1)
MONOCYTES NFR BLD: 7 % (ref 5–13)
NEUTS SEG # BLD: 7 K/UL (ref 1.8–8)
NEUTS SEG NFR BLD: 67 % (ref 32–75)
NITRITE UR QL STRIP.AUTO: NEGATIVE
NRBC # BLD: 0 K/UL (ref 0–0.01)
NRBC BLD-RTO: 0 PER 100 WBC
PH UR STRIP: 6 [PH] (ref 5–8)
PLATELET # BLD AUTO: 211 K/UL (ref 150–400)
PMV BLD AUTO: 9.8 FL (ref 8.9–12.9)
POTASSIUM SERPL-SCNC: 4.2 MMOL/L (ref 3.5–5.1)
PROCALCITONIN SERPL-MCNC: <0.05 NG/ML
PROT SERPL-MCNC: 7.2 G/DL (ref 6.4–8.2)
PROT UR STRIP-MCNC: NEGATIVE MG/DL
RBC # BLD AUTO: 4.79 M/UL (ref 3.8–5.2)
RBC #/AREA URNS HPF: ABNORMAL /HPF (ref 0–5)
SAMPLES BEING HELD,HOLD: NORMAL
SARS-COV-2, COV2: NORMAL
SERVICE CMNT-IMP: ABNORMAL
SODIUM SERPL-SCNC: 135 MMOL/L (ref 136–145)
SOURCE, COVRS: NORMAL
SP GR UR REFRACTOMETRY: <1.005 (ref 1–1.03)
TROPONIN I SERPL-MCNC: <0.05 NG/ML
TROPONIN I SERPL-MCNC: <0.05 NG/ML
UR CULT HOLD, URHOLD: NORMAL
UROBILINOGEN UR QL STRIP.AUTO: 0.2 EU/DL (ref 0.2–1)
WBC # BLD AUTO: 10.3 K/UL (ref 3.6–11)
WBC URNS QL MICRO: ABNORMAL /HPF (ref 0–4)

## 2021-02-10 PROCEDURE — 83735 ASSAY OF MAGNESIUM: CPT

## 2021-02-10 PROCEDURE — 87040 BLOOD CULTURE FOR BACTERIA: CPT

## 2021-02-10 PROCEDURE — 96374 THER/PROPH/DIAG INJ IV PUSH: CPT

## 2021-02-10 PROCEDURE — 82962 GLUCOSE BLOOD TEST: CPT

## 2021-02-10 PROCEDURE — 87635 SARS-COV-2 COVID-19 AMP PRB: CPT

## 2021-02-10 PROCEDURE — 71045 X-RAY EXAM CHEST 1 VIEW: CPT

## 2021-02-10 PROCEDURE — 80053 COMPREHEN METABOLIC PANEL: CPT

## 2021-02-10 PROCEDURE — 83690 ASSAY OF LIPASE: CPT

## 2021-02-10 PROCEDURE — 85025 COMPLETE CBC W/AUTO DIFF WBC: CPT

## 2021-02-10 PROCEDURE — 82728 ASSAY OF FERRITIN: CPT

## 2021-02-10 PROCEDURE — 83605 ASSAY OF LACTIC ACID: CPT

## 2021-02-10 PROCEDURE — 36415 COLL VENOUS BLD VENIPUNCTURE: CPT

## 2021-02-10 PROCEDURE — 86140 C-REACTIVE PROTEIN: CPT

## 2021-02-10 PROCEDURE — 83615 LACTATE (LD) (LDH) ENZYME: CPT

## 2021-02-10 PROCEDURE — 99285 EMERGENCY DEPT VISIT HI MDM: CPT

## 2021-02-10 PROCEDURE — 96361 HYDRATE IV INFUSION ADD-ON: CPT

## 2021-02-10 PROCEDURE — 84145 PROCALCITONIN (PCT): CPT

## 2021-02-10 PROCEDURE — 84484 ASSAY OF TROPONIN QUANT: CPT

## 2021-02-10 PROCEDURE — 93005 ELECTROCARDIOGRAM TRACING: CPT

## 2021-02-10 PROCEDURE — 85384 FIBRINOGEN ACTIVITY: CPT

## 2021-02-10 PROCEDURE — 81001 URINALYSIS AUTO W/SCOPE: CPT

## 2021-02-10 PROCEDURE — 74011250636 HC RX REV CODE- 250/636: Performed by: EMERGENCY MEDICINE

## 2021-02-10 RX ORDER — ONDANSETRON 4 MG/1
4 TABLET, ORALLY DISINTEGRATING ORAL
Qty: 20 TAB | Refills: 0 | Status: SHIPPED | OUTPATIENT
Start: 2021-02-10 | End: 2022-01-12

## 2021-02-10 RX ORDER — ONDANSETRON 2 MG/ML
4 INJECTION INTRAMUSCULAR; INTRAVENOUS
Status: COMPLETED | OUTPATIENT
Start: 2021-02-10 | End: 2021-02-10

## 2021-02-10 RX ADMIN — ONDANSETRON 4 MG: 2 INJECTION INTRAMUSCULAR; INTRAVENOUS at 13:26

## 2021-02-10 RX ADMIN — SODIUM CHLORIDE 500 ML: 9 INJECTION, SOLUTION INTRAVENOUS at 13:29

## 2021-02-10 RX ADMIN — SODIUM CHLORIDE 500 ML: 9 INJECTION, SOLUTION INTRAVENOUS at 15:27

## 2021-02-10 NOTE — ED PROVIDER NOTES
Patient is a 74-year-old female with past medical history significant for hypertension, diabetes and coronary artery disease status post triple-vessel bypass who presents to the emergency department for evaluation of nausea and general malaise that started this morning. She states she felt a bit off prior to getting up but then got up to check her blood pressures because she is had problems with hypertension in the past.  When she checked her blood pressure however she realized it was actually on the low side which is not normal for her. She states she is a heart patient but denies any chest pain or shortness of breath. Denies any known sick contacts. She states that she feels better now but just feels a bit \"flu-eligio\". She states she is not sure if this is important now or not but her primary care doctor did place her on a new diabetes medication about a week ago. Past Medical History:   Diagnosis Date    Diabetes ([facility] [address]     Endocrine disease     Hypertension     S/P triple vessel bypass 2005       No past surgical history on file. No family history on file.     Social History     Socioeconomic History    Marital status:      Spouse name: Not on file    Number of children: Not on file    Years of education: Not on file    Highest education level: Not on file   Occupational History    Not on file   Social Needs    Financial resource strain: Not on file    Food insecurity     Worry: Not on file     Inability: Not on file    Transportation needs     Medical: Not on file     Non-medical: Not on file   Tobacco Use    Smoking status: Not on file   Substance and Sexual Activity    Alcohol use: Not on file    Drug use: Not on file    Sexual activity: Not on file   Lifestyle    Physical activity     Days per week: Not on file     Minutes per session: Not on file    Stress: Not on file   Relationships    Social connections     Talks on phone: Not on file     Gets together: Not on file     Attends Zoroastrianism service: Not on file     Active member of club or organization: Not on file     Attends meetings of clubs or organizations: Not on file     Relationship status: Not on file    Intimate partner violence     Fear of current or ex partner: Not on file     Emotionally abused: Not on file     Physically abused: Not on file     Forced sexual activity: Not on file   Other Topics Concern    Not on file   Social History Narrative    Not on file         ALLERGIES: Patient has no known allergies. Review of Systems   Constitutional: Positive for activity change, chills and fatigue. Negative for diaphoresis and fever. HENT: Negative for drooling, facial swelling and trouble swallowing. Eyes: Negative for photophobia. Respiratory: Negative for chest tightness and shortness of breath. Cardiovascular: Negative for chest pain and leg swelling. Gastrointestinal: Positive for nausea. Negative for abdominal pain, anal bleeding, blood in stool, constipation, diarrhea and vomiting. Genitourinary: Negative for difficulty urinating. Musculoskeletal: Positive for myalgias. Negative for back pain. Skin: Negative for rash. Neurological: Positive for weakness (Generalized). Negative for seizures, syncope and headaches. Hematological: Does not bruise/bleed easily. Psychiatric/Behavioral: Negative. Vitals:    02/10/21 1202   BP: (!) 132/58   Pulse: 72   Resp: 18   Temp: 96.9 °F (36.1 °C)   SpO2: 98%   Weight: 73.3 kg (161 lb 9.6 oz)   Height: 5' 4\" (1.626 m)            Physical Exam  Vitals signs and nursing note reviewed. Constitutional:       Appearance: She is not toxic-appearing or diaphoretic. Comments: Pleasant, elderly   HENT:      Head: Atraumatic. Right Ear: Tympanic membrane, ear canal and external ear normal.      Left Ear: External ear normal.      Nose:      Comments: Mask in place  Eyes:      General: No scleral icterus.   Neck:      Musculoskeletal: Neck supple. Cardiovascular:      Rate and Rhythm: Normal rate and regular rhythm. Heart sounds: Murmur present. No friction rub. Pulmonary:      Effort: Pulmonary effort is normal. No respiratory distress. Breath sounds: Normal breath sounds. No stridor. No wheezing or rhonchi. Abdominal:      Palpations: Abdomen is soft. Tenderness: There is no abdominal tenderness. There is no guarding or rebound. Musculoskeletal:         General: No tenderness or deformity. Right lower leg: No edema. Left lower leg: No edema. Skin:     General: Skin is warm and dry. Findings: No rash. Neurological:      Mental Status: She is alert and oriented to person, place, and time. Psychiatric:         Mood and Affect: Mood normal.         Behavior: Behavior normal.         Thought Content: Thought content normal.         Judgment: Judgment normal.          [facility]  ED Course as of Feb 10 1520   Wed Feb 10, 2021   1325 EKG obtained at 1315 showing normal sinus rhythm, rate 63, normal intervals, inferior Q waves age undetermined, no prior EKG available for comparison. [JE]      ED Course User Index  [JE] Angie Rojo MD       Procedures    3:21 PM  Change of shift. Care of patient signed over to Dr. Michelle Bonilla. Bedside handoff complete. Awaiting rpt trop and ua.

## 2021-02-10 NOTE — ED TRIAGE NOTES
Pt reports this morning she woke up feeling nauseated and weak. She took her BP and it was lower than normal at 101/61 and 99/62. /58. Pt notes she was recently started on a new medication for diabetes 2 days ago. .

## 2021-02-10 NOTE — DISCHARGE INSTRUCTIONS
Follow closely with your primary care doctor and return to the emergency department for any new or worsening symptoms. Drink plenty of fluids.

## 2021-02-10 NOTE — TELEPHONE ENCOUNTER
----- Message from Sarai Woods sent at 2/10/2021 10:10 AM EST -----  Regarding: /Level One      Level 1/Escalated Issue      Caller's first and last name and relationship (if not the patient): Self      Best contact number(s):[phone]      What are the symptoms: Low blood pressure,feeling sick      Transfer successful - yes/no (include outcome): No    Transfer declined - yes/no (include reason): Yes, advised to tell pt go to ED. Was caller advised to seek appropriate level of care - yes/no: Yes      Details to clarify the request: Pt woke up sick this morning and checked blood pressure which was very low. Pt is getting readings this morning  of 101/58, 99/56, 108/61, 118/64, 106/58. Pt is feeling nauseous. Pt advised she started a new bp medication on Monday. Spoke with Kate Wilkinson at [facility] who advised if no available virtual appt to advise pt to seek appropriate medical care at ED.          Sarai Woods

## 2021-02-10 NOTE — ED NOTES
Care assumed from Dr. Janis Cotter, 71-year-old female presenting with complaints of fatigue, and nausea. Unremarkable lab work and imaging thus far, repeat troponin and UA pending, patient remains normotensive in no acute distress, disposition pending.

## 2021-02-10 NOTE — ED NOTES

## 2021-02-11 LAB
ATRIAL RATE: 63 BPM
CALCULATED P AXIS, ECG09: 59 DEGREES
CALCULATED R AXIS, ECG10: 12 DEGREES
CALCULATED T AXIS, ECG11: 73 DEGREES
DIAGNOSIS, 93000: NORMAL
P-R INTERVAL, ECG05: 186 MS
Q-T INTERVAL, ECG07: 386 MS
QRS DURATION, ECG06: 86 MS
QTC CALCULATION (BEZET), ECG08: 395 MS
VENTRICULAR RATE, ECG03: 63 BPM

## 2021-02-15 LAB
BACTERIA SPEC CULT: NORMAL
SERVICE CMNT-IMP: NORMAL

## 2021-02-16 ENCOUNTER — OFFICE VISIT (OUTPATIENT)
Dept: FAMILY MEDICINE CLINIC | Age: 81
End: 2021-02-16
Payer: MEDICARE

## 2021-02-16 VITALS
HEART RATE: 76 BPM | HEIGHT: 64 IN | DIASTOLIC BLOOD PRESSURE: 52 MMHG | SYSTOLIC BLOOD PRESSURE: 103 MMHG | WEIGHT: 161.4 LBS | BODY MASS INDEX: 27.55 KG/M2 | OXYGEN SATURATION: 95 % | RESPIRATION RATE: 16 BRPM | TEMPERATURE: 97.9 F

## 2021-02-16 DIAGNOSIS — R53.83 FATIGUE, UNSPECIFIED TYPE: Primary | ICD-10-CM

## 2021-02-16 DIAGNOSIS — I95.1 ORTHOSTATIC HYPOTENSION: ICD-10-CM

## 2021-02-16 DIAGNOSIS — I25.810 CORONARY ARTERY DISEASE INVOLVING OTHER CORONARY ARTERY BYPASS GRAFT WITHOUT ANGINA PECTORIS: ICD-10-CM

## 2021-02-16 LAB — TSH SERPL DL<=0.05 MIU/L-ACNC: 1.51 UIU/ML (ref 0.36–3.74)

## 2021-02-16 PROCEDURE — 99213 OFFICE O/P EST LOW 20 MIN: CPT | Performed by: FAMILY MEDICINE

## 2021-02-16 NOTE — PROGRESS NOTES
Nhan Belle is a 68 y.o. female who presents today for ED follow up. Pt was seen 2/10/21 for feeling \"off. \" was having chest pressure on and off at the time. Pt had negative Troponin. Pt denied any vomiting or diarrhea but was nauseous at the time. Pt reported that he BP is usually high but is now low. Pt has significant cardiac hx and followed at [facility] Hx triple bypass 2005, multiple caths. Most recent 10/2018 and was clean, 2 stents in 2009. Unsure of when her last ECHO was. Is trying to switch to someone in [facility] due to insurance, calling Dr. Rosmery Piña office today. Checking BG and usually in high 100s. Recently stared Mike. Upon  questioning does have some dizziness on standing but not every time        Data reviewed or ordered today:       Other problems include: There is no problem list on file for this patient. Medications:  Current Outpatient Medications   Medication Sig Dispense Refill    ondansetron (Zofran ODT) 4 mg disintegrating tablet 1 Tab by SubLINGual route every eight (8) hours as needed for Nausea or Vomiting. 20 Tab 0    amLODIPine (NORVASC) 10 mg tablet       fosinopriL (MONOPRIL) 10 mg tablet Take 1 Tab by mouth daily. 90 Tab 1    doxazosin (CARDURA) 1 mg tablet TAKE 1 TABLET BY MOUTH ONCE DAILY 30 Tab 0    isosorbide mononitrate ER (IMDUR) 60 mg CR tablet 60 mg = 1 tab each dose, PO, daily, # 90 tab, 3 Refills, Pharmacy: [address] 30 Tab 0    omeprazole (PRILOSEC) 20 mg capsule 20 mg = 1 CAP each dose, PO, daily, # 90 CAP, 3 Refills, Pharmacy: [address] 60 Cap 0    rosuvastatin (CRESTOR) 40 mg tablet TAKE 1 TABLET BY MOUTH ONCE DAILY 60 Tab 0    empagliflozin (JARDIANCE) 10 mg tablet Take 1 Tab by mouth daily. 90 Tab 1    Januvia 100 mg tablet TAKE 1 TABLET BY MOUTH ONCE DAILY 30 Tab 0    glipiZIDE (GLUCOTROL) 10 mg tablet Take  by mouth. Unknown dose         Allergies:  No Known Allergies    LMP:  No LMP recorded.  Patient is postmenopausal.    Social History     Socioeconomic History    Marital status:      Spouse name: Not on file    Number of children: Not on file    Years of education: Not on file    Highest education level: Not on file   Occupational History    Not on file   Social Needs    Financial resource strain: Not on file    Food insecurity     Worry: Not on file     Inability: Not on file    Transportation needs     Medical: Not on file     Non-medical: Not on file   Tobacco Use    Smoking status: Never Smoker    Smokeless tobacco: Never Used   Substance and Sexual Activity    Alcohol use: Not Currently     Frequency: Never    Drug use: Not on file    Sexual activity: Never   Lifestyle    Physical activity     Days per week: Not on file     Minutes per session: Not on file    Stress: Not on file   Relationships    Social connections     Talks on phone: Not on file     Gets together: Not on file     Attends Spiritism service: Not on file     Active member of club or organization: Not on file     Attends meetings of clubs or organizations: Not on file     Relationship status: Not on file    Intimate partner violence     Fear of current or ex partner: Not on file     Emotionally abused: Not on file     Physically abused: Not on file     Forced sexual activity: Not on file   Other Topics Concern    Not on file   Social History Narrative    Not on file       ROS:  Fever: no  Weight loss: no  Headaches:  no  Chest Pain:  no  SOB:  no  Cough: no  Other significant ROS:        Physical Exam  Visit Vitals  BP (!) 103/52 (BP 1 Location: Left upper arm, BP Patient Position: Sitting, BP Cuff Size: Adult)   Pulse 76   Temp 97.9 °F (36.6 °C) (Temporal)   Resp 16   Ht 5' 4\" (1.626 m)   Wt 161 lb 6.4 oz (73.2 kg)   SpO2 95%   BMI 27.70 kg/m²     Constitutional:  Appears well,  No acute distress, Vitals noted  Psychiatric:   Affect normal, Alert and Oriented to person/place/time  Eyes:   Pupils equally round and reactive, EOMI, conjunctiva clear  Neck:   General inspection and Thyroid normal.  No abnormal cervical or supraclavicular nodes. Lungs:   Clear to auscultation, good respiratory effort, no wheezes, rales or rhonchi  Heart:   Normal HR, Normal S1 and S2,  Regular rhythm. 2/6 systolic ejection murmur. No carotid bruits. Chest wall normal  Extremities:   without edema, good peripheral pulses  Skin:  Warm to palpation, without rashes, bruising, or suspicious lesions     BP   Layin/73 HR 69  Sitting 126/69 HR 72  Standing 117/68 HR 79     Assessment/Plan:      ICD-10-CM ICD-9-CM    1. Coronary artery disease involving other coronary artery bypass graft without angina pectoris  I25.810 414.05 ECHO ADULT COMPLETE   2. Fatigue, unspecified type  R53.83 780.79 ECHO ADULT COMPLETE      TSH 3RD GENERATION     1. Fatigue, unspecified type: lab work in ED without any significant abnormalities, given significant cardiac Hx concern that could be possible etiology. - ECHO ADULT COMPLETE; Future  - TSH 3RD GENERATION; Future    2. Coronary artery disease involving other coronary artery bypass graft without angina pectoris: given sxs would likely benefit from ECHO, Pt never told she has murmur    - ECHO ADULT COMPLETE; Future    3.  Orthostatic hypotension: likely 2/2 to medication, currently on norvasc, Cardura, and Imdur.   - discussed that she should hold Cardura if sxs return   - follow up cards      Orders Placed This Encounter    TSH 3RD GENERATION     Standing Status:   Future     Standing Expiration Date:   2022         Moose Rader MD   [facility] Medicine Res[facility]

## 2021-02-16 NOTE — PROGRESS NOTES
Chief Complaint   Patient presents with    Follow Up Chronic Condition     Patient presents to follow up on low blood pressure. Vitals:    02/16/21 0840   BP: (!) 103/52   BP 1 Location: Left upper arm   BP Patient Position: Sitting   BP Cuff Size: Adult   Pulse: 76   Resp: 16   Temp: 97.9 °F (36.6 °C)   TempSrc: Temporal   SpO2: 95%   Weight: 161 lb 6.4 oz (73.2 kg)   Height: 5' 4\" (1.626 m)     1. Have you been to the ER, urgent care clinic since your last visit? Hospitalized since your last visit? Yes, [facility] urgent care. 2. Have you seen or consulted any other health care providers outside of the [address] [place] [address] since your last visit? Include any pap smears or colon screening.  No

## 2021-03-01 ENCOUNTER — TELEPHONE (OUTPATIENT)
Dept: FAMILY MEDICINE CLINIC | Age: 81
End: 2021-03-01

## 2021-03-01 ENCOUNTER — HOSPITAL ENCOUNTER (OUTPATIENT)
Dept: NON INVASIVE DIAGNOSTICS | Age: 81
Discharge: HOME | End: 2021-03-01
Attending: FAMILY MEDICINE
Payer: MEDICARE

## 2021-03-01 VITALS
WEIGHT: 161 LBS | BODY MASS INDEX: 27.49 KG/M2 | DIASTOLIC BLOOD PRESSURE: 86 MMHG | HEIGHT: 64 IN | SYSTOLIC BLOOD PRESSURE: 129 MMHG

## 2021-03-01 DIAGNOSIS — R53.83 FATIGUE, UNSPECIFIED TYPE: ICD-10-CM

## 2021-03-01 DIAGNOSIS — I25.810 CORONARY ARTERY DISEASE INVOLVING OTHER CORONARY ARTERY BYPASS GRAFT WITHOUT ANGINA PECTORIS: ICD-10-CM

## 2021-03-01 LAB
ECHO AO ROOT DIAM: 3.19 CM
ECHO AV AREA PEAK VELOCITY: 1.85 CM2
ECHO AV AREA/BSA PEAK VELOCITY: 1 CM2/M2
ECHO AV PEAK GRADIENT: 6.87 MMHG
ECHO AV PEAK VELOCITY: 131.02 CM/S
ECHO LA AREA 4C: 16 CM2
ECHO LA MAJOR AXIS: 3.51 CM
ECHO LA MINOR AXIS: 1.97 CM
ECHO LA VOL 2C: 39.69 ML (ref 22–52)
ECHO LA VOL 4C: 45.03 ML (ref 22–52)
ECHO LA VOL BP: 46.18 ML (ref 22–52)
ECHO LA VOL/BSA BIPLANE: 25.89 ML/M2 (ref 16–28)
ECHO LA VOLUME INDEX A2C: 22.25 ML/M2 (ref 16–28)
ECHO LA VOLUME INDEX A4C: 25.24 ML/M2 (ref 16–28)
ECHO LV E' LATERAL VELOCITY: 10.51 CENTIMETER/SECOND
ECHO LV E' SEPTAL VELOCITY: 7.03 CENTIMETER/SECOND
ECHO LV INTERNAL DIMENSION DIASTOLIC: 4.48 CM (ref 3.9–5.3)
ECHO LV INTERNAL DIMENSION SYSTOLIC: 3.13 CM
ECHO LV IVSD: 0.91 CM (ref 0.6–0.9)
ECHO LV MASS 2D: 121.2 G (ref 67–162)
ECHO LV MASS INDEX 2D: 68 G/M2 (ref 43–95)
ECHO LV POSTERIOR WALL DIASTOLIC: 0.78 CM (ref 0.6–0.9)
ECHO LVOT DIAM: 1.75 CM
ECHO LVOT PEAK GRADIENT: 4.06 MMHG
ECHO LVOT PEAK VELOCITY: 100.69 CM/S
ECHO MV A VELOCITY: 79.77 CENTIMETER/SECOND
ECHO MV E DECELERATION TIME (DT): 247.75 MS
ECHO MV E VELOCITY: 60.75 CENTIMETER/SECOND
ECHO PV PEAK INSTANTANEOUS GRADIENT SYSTOLIC: 1.94 MMHG
ECHO PV REGURGITANT MAX VELOCITY: 69.69 CM/S
ECHO RV INTERNAL DIMENSION: 3.02 CM
ECHO RV TAPSE: 1.73 CM (ref 1.5–2)
ECHO TV REGURGITANT MAX VELOCITY: 247.4 CM/S
ECHO TV REGURGITANT PEAK GRADIENT: 24.48 MMHG
LA VOL DISK BP: 43.84 ML (ref 22–52)

## 2021-03-01 PROCEDURE — 93306 TTE W/DOPPLER COMPLETE: CPT

## 2021-03-01 NOTE — TELEPHONE ENCOUNTER
[phone]    Patient called to say this medication may not be agreeing with her as not feeling good on the inside. Some nausea and also making her nervous at times. She will drink Gatorade which seems to help.    empagliflozin (JARDIANCE) 10 mg tablet [    Asking for advice. Today at 1 pm is not a good time to call today as she has another appt at that time.

## 2021-03-05 NOTE — TELEPHONE ENCOUNTER
Already addressed. /Telephone--3/3/3/21    Message Contents   Jessie CANDELARIA [facility] Front Office               General Message/Vendor Calls     Caller's first and last name: Self     Reason for call: Pt requesting medical advise from a nurse     Callback required yes/no and why: Yes to assist with medical issue. Best contact number(s): [phone]     Details to clarify the request: Pt is following up on call she made regarding not feeling well in her body since she started taking the empagliflozin (JARDIANCE) 10 mg tablet. Pt is having a shaking feeling at times that makes her feel nervous like or like a panic attack.      Kirsten Colon

## 2021-03-08 ENCOUNTER — VIRTUAL VISIT (OUTPATIENT)
Dept: FAMILY MEDICINE CLINIC | Age: 81
End: 2021-03-08
Payer: MEDICARE

## 2021-03-08 DIAGNOSIS — R53.81 MALAISE: ICD-10-CM

## 2021-03-08 DIAGNOSIS — R11.0 NAUSEA: Primary | ICD-10-CM

## 2021-03-08 PROCEDURE — 99442 PR PHYS/QHP TELEPHONE EVALUATION 11-20 MIN: CPT | Performed by: STUDENT IN AN ORGANIZED HEALTH CARE EDUCATION/TRAINING PROGRAM

## 2021-03-08 RX ORDER — NITROGLYCERIN 80 MG/1
PATCH TRANSDERMAL
COMMUNITY
Start: 2020-03-16 | End: 2021-04-22 | Stop reason: SDUPTHER

## 2021-03-08 NOTE — PROGRESS NOTES
Tomy Gallagher  68 y.o. female  1940  [address] [place] [address]  [id number]    [phone] (home)      [address]:    Telephone Encounter  Aleksandar Beltran MD       Encounter Date: 3/8/2021 at 10:19 AM    Consent: Tomy Gallagher, who was seen by synchronous (real-time) audio only technology, and/or her healthcare decision maker, is aware that this patient-initiated, Telehealth encounter on 3/8/2021 is a billable service, with coverage as determined by her insurance carrier. She is aware that she may receive a bill and has provided verbal consent to proceed: Yes. Chief Complaint   Patient presents with    Nausea       History of Present Illness   Tomy Gallagher is a 68 y.o. female was evaluated by telephone. Patient has a history of Type II DM, CAD s/ triple bypass, HTN. She presents to discuss nausea. Reports nausea, general malaise, and jitteriness since starting Jardiance 10mg daily about three weeks ago. Occurs in the mornings; takes her meds at night. Drinking Gatorade Zero helps. BG low 200s and BPs have been 130s-150s/70s. BP now 118/70s. Seen by ED in February; labs normal and patient discharged with Zofran with mild relief. Appetite is ok. Noted to have hypoglycemia in the past and was told to hold her Glipizide. Had GI side effects on Metformin years agpo. A1c 7.2 in 1/2021. Seen in clinic last month for similar symptoms and was found to have orthostatic hypotension. Drinking 4 bottles of water daily and denies lightheadedness. Had echo done 3/1 showing EF 55-60 with mild pulmonic regurgitation. Seeing Dr. Clemencia Mcmahon on 3/22. Denies URI symptoms, chest pain, SOB, abdominal pain, vomiting, bloody stools, constipation, or diarrhea. Last BM two days ago. Review of Systems   Review of Systems   Constitutional: Negative for chills and fever. HENT: Negative for congestion and sore throat.     Eyes: Negative for blurred vision and double vision. Respiratory: Negative for cough and shortness of breath. Cardiovascular: Negative for chest pain and palpitations. Gastrointestinal: Positive for nausea. Negative for abdominal pain and vomiting. Genitourinary: Negative for dysuria and urgency. Musculoskeletal: Negative for joint pain and myalgias. Neurological: Negative for focal weakness and headaches. Vitals/Objective:   General: Patient speaking in complete sentences without effort. Normal speech and cooperative. Due to this being a Virtual Check-in/Telephone evaluation, many elements of the physical examination are unable to be assessed. Assessment and Plan: Total Time: minutes: 11-20 minutes      Nausea/malaise/jitteriness: Possibly related to starting Jardiance. Has previously tolerated Januvia, so will switch back to this. Has had issues with orthostatic hypotension, although majority of BP in normal range. Low threshold to decrease antihypertensives in the future if this persists. Given significant cardiac history, patient should keep Cardiology f/u to r/o cardiac etiology. -D/c Jardiance  -Start Januvia 100mg daily  -Monitor for hypoglycemia  -F/u with Cardiology     Patient informed to follow up: 1-2 weeks    I affirm this is a Patient Initiated Episode with an Established Patient who has not had a related appointment within my department in the past 7 days or scheduled within the next 24 hours. Note: not billable if this call serves to triage the patient into an appointment for the relevant concern      Electronically Signed: Lit Orta MD,  resident  Providers location when delivering service: home    CPT:  54109 (5-10 minutes)  [phone] (11-20 minutes)  21  (21-30 minutes)    Medicare:   - Virtual Check-in      ICD-10-CM ICD-9-CM    1. Nausea  R11.0 787.02    2.  Malaise  R53.81 780.79        Pursuant to the emergency declaration under the [address] [facility] authority and the Coronavirus Preparedness and [employer] Act, this Virtual  Visit was conducted, with patient's consent, to reduce the patient's risk of exposure to COVID-19 and provide continuity of care for an established patient. History   Patients past medical, surgical and family histories were personally reviewed and updated. Past Medical History:   Diagnosis Date    Diabetes ([address]     Endocrine disease     Hypertension     S/P triple vessel bypass 2005     No past surgical history on file. No family history on file. Social History     Tobacco Use    Smoking status: Never Smoker    Smokeless tobacco: Never Used   Substance Use Topics    Alcohol use: Not Currently     Frequency: Never    Drug use: Not on file              Current Medications/Allergies   Medications and Allergies reviewed:    Current Outpatient Medications   Medication Sig Dispense Refill    nitroglycerin (NITRODUR) 0.4 mg/hr = 1 patch each dose, Topical, daily, # 90 patch, 3 Refills, Pharmacy: [facility] [address]      SITagliptin (JANUVIA) 100 mg tablet Take 1 Tab by mouth daily. 60 Tab 0    ondansetron (Zofran ODT) 4 mg disintegrating tablet 1 Tab by SubLINGual route every eight (8) hours as needed for Nausea or Vomiting. 20 Tab 0    amLODIPine (NORVASC) 10 mg tablet       fosinopriL (MONOPRIL) 10 mg tablet Take 1 Tab by mouth daily. 90 Tab 1    empagliflozin (JARDIANCE) 10 mg tablet Take 1 Tab by mouth daily.  90 Tab 1    doxazosin (CARDURA) 1 mg tablet TAKE 1 TABLET BY MOUTH ONCE DAILY 30 Tab 0    isosorbide mononitrate ER (IMDUR) 60 mg CR tablet 60 mg = 1 tab each dose, PO, daily, # 90 tab, 3 Refills, Pharmacy: [facility] [address] 30 Tab 0    omeprazole (PRILOSEC) 20 mg capsule 20 mg = 1 CAP each dose, PO, daily, # 90 CAP, 3 Refills, Pharmacy: [facility] [address] 60 Cap 0    rosuvastatin (CRESTOR) 40 mg tablet TAKE 1 TABLET BY MOUTH ONCE DAILY 60 Tab 0     Allergies Allergen Reactions    Metformin Diarrhea

## 2021-03-09 NOTE — PROGRESS NOTES
[address] Medicine Residency Attending Addendum:  Dr. Sreekanth Eastman MD,  the patient and I were not physically present during this encounter. The resident and I are concurrently monitoring the patient care through appropriate telecommunication technology. I discussed the findings, assessment and plan with the resident and agree with the resident's findings and plan as documented in the resident's note.       Karley Hahn MD

## 2021-03-12 ENCOUNTER — TELEPHONE (OUTPATIENT)
Dept: FAMILY MEDICINE CLINIC | Age: 81
End: 2021-03-12

## 2021-03-12 NOTE — TELEPHONE ENCOUNTER
LVM that Januvia prior auth approved and medication should be available for pickup from her pharmacy today.

## 2021-03-19 ENCOUNTER — VIRTUAL VISIT (OUTPATIENT)
Dept: FAMILY MEDICINE CLINIC | Age: 81
End: 2021-03-19
Payer: MEDICARE

## 2021-03-19 DIAGNOSIS — R11.0 NAUSEA: ICD-10-CM

## 2021-03-19 DIAGNOSIS — E11.69 TYPE 2 DIABETES MELLITUS WITH OTHER SPECIFIED COMPLICATION, UNSPECIFIED WHETHER LONG TERM INSULIN USE (HCC): Primary | ICD-10-CM

## 2021-03-19 DIAGNOSIS — R53.81 MALAISE: ICD-10-CM

## 2021-03-19 PROCEDURE — 99442 PR PHYS/QHP TELEPHONE EVALUATION 11-20 MIN: CPT | Performed by: STUDENT IN AN ORGANIZED HEALTH CARE EDUCATION/TRAINING PROGRAM

## 2021-03-19 NOTE — PROGRESS NOTES
[address] Medicine Residency Attending Addendum:  Dr. Mehul Gomez MD,  the patient and I were not physically present during this encounter. The resident and I are concurrently monitoring the patient care through appropriate telecommunication technology. I discussed the findings, assessment and plan with the resident and agree with the resident's findings and plan as documented in the resident's note.       Bridger Branch MD

## 2021-03-19 NOTE — PROGRESS NOTES
Agueda Andino  68 y.o. female  1940  [address] [place] [address]  [id number]    [phone] (home)      [address]:    Telephone Encounter  Tg Santiago MD       Encounter Date: 3/19/2021 at 10:12 AM    Consent: Agueda Andino, who was seen by synchronous (real-time) audio only technology, and/or her healthcare decision maker, is aware that this patient-initiated, Telehealth encounter on 3/19/2021 is a billable service, with coverage as determined by her insurance carrier. She is aware that she may receive a bill and has provided verbal consent to proceed: Yes. Chief Complaint   Patient presents with    Nausea    Malaise    Diabetes       History of Present Illness   Agueda Andino is a 68 y.o. female was evaluated by telephone. Patient has a history of Type II DM, CAD s/p triple bypass, HTN. She presents to follow up nausea and general malaise. Noticed nausea and general malaise after starting Jardiance 10mg daily last month. Had been on Januvia 100mg daily (d/c due to insurance issues) along with Glipizide (d/c for hypoglycemia) for about 2 years and tolerated this well. During last visit, switched back to Januvia 100mg daily. States symptoms have resolved. Fasting BG have been 160-180s with one reading 224. Breakfast-low sugar cereal with apple, turkey sausage, egg,  Lunch- sandwich   Dinner-fish, turkey, chicken, vegetables   Eats unsweetened applesauce in between meals. Dilutes her apple juice. Limits sweets. Review of Systems   Review of Systems   Constitutional: Negative for chills and fever. HENT: Negative for congestion and sore throat. Eyes: Negative for blurred vision and double vision. Respiratory: Negative for cough and shortness of breath. Cardiovascular: Negative for chest pain and palpitations. Gastrointestinal: Negative for abdominal pain and nausea.    Genitourinary: Negative for dysuria and urgency. Musculoskeletal: Negative for joint pain and myalgias. Neurological: Negative for focal weakness and headaches. Vitals/Objective:   General: Patient speaking in complete sentences without effort. Normal speech and cooperative. Due to this being a Virtual Check-in/Telephone evaluation, many elements of the physical examination are unable to be assessed. Assessment and Plan: Total Time: minutes: 11-20 minutes     Type II DM: A1c at goal. Had nausea and malaise associated with Jardiance, however symptoms have now resolved since d/c'ing this medication.  -Continue Januvia 100mg daily   -F/u in 2 months     Patient informed to follow up: 2 months virtually    I affirm this is a Patient Initiated Episode with an Established Patient who has not had a related appointment within my department in the past 7 days or scheduled within the next 24 hours. Note: not billable if this call serves to triage the patient into an appointment for the relevant concern      Electronically Signed: Deborah Olguin MD,  resident  Providers location when delivering service: office    CPT:  05266 (5-10 minutes)  [phone] (11-20 minutes)  21  (21-30 minutes)    Medicare:  [address]      ICD-10-CM ICD-9-CM    1. Type 2 diabetes mellitus with other specified complication, unspecified whether long term insulin use (HCC)  E11.69 250.80    2. Nausea  R11.0 787.02    3. Malaise  R53.81 780.79        Pursuant to the emergency declaration under the [facility] waiver authority and the [employer] and [employer] Act, this Virtual  Visit was conducted, with patient's consent, to reduce the patient's risk of exposure to COVID-19 and provide continuity of care for an established patient. History   Patients past medical, surgical and family histories were personally reviewed and updated.       Past Medical History:   Diagnosis Date  Diabetes ([address]     Endocrine disease     Hypertension     S/P triple vessel bypass 2005     No past surgical history on file. No family history on file. Social History     Tobacco Use    Smoking status: Never Smoker    Smokeless tobacco: Never Used   Substance Use Topics    Alcohol use: Not Currently     Frequency: Never    Drug use: Not on file              Current Medications/Allergies   Medications and Allergies reviewed:    Current Outpatient Medications   Medication Sig Dispense Refill    nitroglycerin (NITRODUR) 0.4 mg/hr = 1 patch each dose, Topical, daily, # 90 patch, 3 Refills, Pharmacy: [address]      SITagliptin (JANUVIA) 100 mg tablet Take 1 Tab by mouth daily. 60 Tab 0    ondansetron (Zofran ODT) 4 mg disintegrating tablet 1 Tab by SubLINGual route every eight (8) hours as needed for Nausea or Vomiting. 20 Tab 0    amLODIPine (NORVASC) 10 mg tablet       fosinopriL (MONOPRIL) 10 mg tablet Take 1 Tab by mouth daily.  90 Tab 1    doxazosin (CARDURA) 1 mg tablet TAKE 1 TABLET BY MOUTH ONCE DAILY 30 Tab 0    isosorbide mononitrate ER (IMDUR) 60 mg CR tablet 60 mg = 1 tab each dose, PO, daily, # 90 tab, 3 Refills, Pharmacy: [address] 30 Tab 0    omeprazole (PRILOSEC) 20 mg capsule 20 mg = 1 CAP each dose, PO, daily, # 90 CAP, 3 Refills, Pharmacy: [address] 60 Cap 0    rosuvastatin (CRESTOR) 40 mg tablet TAKE 1 TABLET BY MOUTH ONCE DAILY 60 Tab 0     Allergies   Allergen Reactions    Metformin Diarrhea

## 2021-03-22 ENCOUNTER — OFFICE VISIT (OUTPATIENT)
Dept: CARDIOLOGY CLINIC | Age: 81
End: 2021-03-22
Payer: MEDICARE

## 2021-03-22 VITALS
HEART RATE: 55 BPM | WEIGHT: 156 LBS | DIASTOLIC BLOOD PRESSURE: 80 MMHG | HEIGHT: 64 IN | SYSTOLIC BLOOD PRESSURE: 151 MMHG | BODY MASS INDEX: 26.63 KG/M2 | OXYGEN SATURATION: 98 %

## 2021-03-22 DIAGNOSIS — I25.708 CORONARY ARTERY DISEASE OF BYPASS GRAFT OF NATIVE HEART WITH STABLE ANGINA PECTORIS (HCC): Primary | ICD-10-CM

## 2021-03-22 DIAGNOSIS — E78.5 DYSLIPIDEMIA: ICD-10-CM

## 2021-03-22 DIAGNOSIS — I10 ESSENTIAL HYPERTENSION: ICD-10-CM

## 2021-03-22 PROCEDURE — 1090F PRES/ABSN URINE INCON ASSESS: CPT | Performed by: SPECIALIST

## 2021-03-22 PROCEDURE — 99204 OFFICE O/P NEW MOD 45 MIN: CPT | Performed by: SPECIALIST

## 2021-03-22 PROCEDURE — G8754 DIAS BP LESS 90: HCPCS | Performed by: SPECIALIST

## 2021-03-22 PROCEDURE — G8753 SYS BP > OR = 140: HCPCS | Performed by: SPECIALIST

## 2021-03-22 RX ORDER — ASPIRIN 81 MG/1
81 TABLET ORAL DAILY
COMMUNITY

## 2021-03-22 NOTE — PROGRESS NOTES
Chief Complaint   Patient presents with    Coronary Artery Disease    Other     ORTHOSTATIC HYPO     Visit Vitals  BP (!) 151/80 (BP 1 Location: Left upper arm, BP Patient Position: Sitting)   Pulse (!) 55   Ht 5' 4\" (1.626 m)   Wt 156 lb (70.8 kg)   SpO2 98%   BMI 26.78 kg/m²     Chest pain denied   Palpations denied  SOB denied  Dizziness denied  Swelling in hands/feet denied   Recent hospital stays denied

## 2021-03-22 NOTE — PROGRESS NOTES
Autumn Ko MD. [facility]              Patient: Carolyn Romo  : 1940      Today's Date: 3/22/2021          HISTORY OF PRESENT ILLNESS:     History of Present Illness:  Here to establish a cardiology relationship (transferring from [facility]). She has a history of CAD/CABG. Was being seen at [facility] for insurance. No recent cardiac issues. No CP or SOB. No syncope. She feels well cardiac wise. BP OK at home. PAST MEDICAL HISTORY:     Past Medical History:   Diagnosis Date    CAD (coronary artery disease)     Diabetes ([address]     Endocrine disease     Hx of CABG     Hypertension     S/P triple vessel bypass            MEDICATIONS:     Current Outpatient Medications   Medication Sig Dispense Refill    aspirin delayed-release 81 mg tablet Take 81 mg by mouth daily.  nitroglycerin (NITRODUR) 0.4 mg/hr = 1 patch each dose, Topical, daily, # 90 patch, 3 Refills, Pharmacy: [address]      SITagliptin (JANUVIA) 100 mg tablet Take 1 Tab by mouth daily. 60 Tab 0    ondansetron (Zofran ODT) 4 mg disintegrating tablet 1 Tab by SubLINGual route every eight (8) hours as needed for Nausea or Vomiting. 20 Tab 0    amLODIPine (NORVASC) 10 mg tablet       fosinopriL (MONOPRIL) 10 mg tablet Take 1 Tab by mouth daily.  90 Tab 1    doxazosin (CARDURA) 1 mg tablet TAKE 1 TABLET BY MOUTH ONCE DAILY 30 Tab 0    isosorbide mononitrate ER (IMDUR) 60 mg CR tablet 60 mg = 1 tab each dose, PO, daily, # 90 tab, 3 Refills, Pharmacy: [address] 30 Tab 0    omeprazole (PRILOSEC) 20 mg capsule 20 mg = 1 CAP each dose, PO, daily, # 90 CAP, 3 Refills, Pharmacy: [address] 12 Cap 0    rosuvastatin (CRESTOR) 40 mg tablet TAKE 1 TABLET BY MOUTH ONCE DAILY 60 Tab 0       Allergies   Allergen Reactions    Jardiance [Empagliflozin] Nausea Only     HYPOTENSION    Metformin Diarrhea             SOCIAL HISTORY:     Social History     Tobacco Use    Smoking status: Never Smoker    Smokeless tobacco: Never Used   Substance Use Topics    Alcohol use: Not Currently     Frequency: Never    Drug use: Not on file         FAMILY HISTORY:     FH non-contributory         REVIEW OF SYMPTOMS:     Review of Symptoms:  Constitutional: Negative for fever, chills  HEENT: Negative for nosebleeds, tinnitus, and vision changes. Respiratory: Negative for cough, wheezing  Cardiovascular: Negative for orthopnea, claudication, syncope, and PND. Gastrointestinal: Negative for abdominal pain, diarrhea, melena. Genitourinary: Negative for dysuria  Musculoskeletal: Negative for myalgias. Skin: Negative for rash  Heme: No problems bleeding. Neurological: Negative for speech change and focal weakness. PHYSICAL EXAM:     Physical Exam:  Visit Vitals  BP (!) 151/80 (BP 1 Location: Left upper arm, BP Patient Position: Sitting)   Pulse (!) 55   Ht 5' 4\" (1.626 m)   Wt 156 lb (70.8 kg)   SpO2 98%   BMI 26.78 kg/m²     Patient appears generally well, mood and affect are appropriate and pleasant. HEENT:  Hearing intact, non-icteric, normocephalic, atraumatic. Neck Exam: Supple, No JVD or carotid bruits. Lung Exam: Clear to auscultation, even breath sounds. Cardiac Exam: Regular rate and rhythm with 2/6 systolic murmur  Abdomen: Soft, non-tender, normal bowel sounds. No bruits or masses. Extremities: Moves all ext well. No lower extremity edema. MSKTL: Overall good ROM ext  Skin: No significant rashes  Vascular: 2+ dorsalis pedis pulses bilaterally.   Psych: Appropriate affect  Neuro - Grossly intact      LABS / OTHER STUDIES reviewed:       Lab Results   Component Value Date/Time    Sodium 135 (L) 02/10/2021 01:18 PM    Potassium 4.2 02/10/2021 01:18 PM    Chloride 102 02/10/2021 01:18 PM    CO2 26 02/10/2021 01:18 PM    Anion gap 7 02/10/2021 01:18 PM    Glucose 315 (H) 02/10/2021 01:18 PM    BUN 17 02/10/2021 01:18 PM    Creatinine 1.06 (H) 02/10/2021 01:18 PM BUN/Creatinine ratio 16 02/10/2021 01:18 PM    GFR est AA >60 02/10/2021 01:18 PM    GFR est non-AA 50 (L) 02/10/2021 01:18 PM    Calcium 9.1 02/10/2021 01:18 PM    Bilirubin, total 0.3 02/10/2021 01:18 PM    Alk. phosphatase 93 02/10/2021 01:18 PM    Protein, total 7.2 02/10/2021 01:18 PM    Albumin 3.5 02/10/2021 01:18 PM    Globulin 3.7 02/10/2021 01:18 PM    A-G Ratio 0.9 (L) 02/10/2021 01:18 PM    ALT (SGPT) 17 02/10/2021 01:18 PM    AST (SGOT) 15 02/10/2021 01:18 PM     Lab Results   Component Value Date/Time    WBC 10.3 02/10/2021 01:18 PM    HGB 13.3 02/10/2021 01:18 PM    HCT 39.8 02/10/2021 01:18 PM    PLATELET 316  01:18 PM    MCV 83.1 02/10/2021 01:18 PM       Lab Results   Component Value Date/Time    Cholesterol, total 149 01/15/2021 09:19 AM    HDL Cholesterol 53 01/15/2021 09:19 AM    LDL, calculated 83.8 01/15/2021 09:19 AM    VLDL, calculated 12.2 01/15/2021 09:19 AM    Triglyceride 61 01/15/2021 09:19 AM    CHOL/HDL Ratio 2.8 01/15/2021 09:19 AM       Lab Results   Component Value Date/Time    TSH 1.51 2021 11:00 AM             CARDIAC DIAGNOSTICS:     Cardiac Evaluation Includes:  I reviewed the results below. Cath 2018 ([facility]) - no intervention needed - vessels looked OK per patient     EKG 2/10/21 - NSR, old inferior infarct - I viewed myself       ASSESSMENT AND PLAN:     Assessment and Plan:  1) CAD  - History of CABG (was managed at [facility] for years)   - She feels well without angina. - will get [facility] data to review   - cont statin, ASA    2) HTN  - BP up today - but she says better at home - 106/70 or so at home ---> follow at home and continue current meds     3) Dyslipidemia   - recent LDL OK   - cont statin     4) See me back in 6 months (her preference)     Lives with . Has 3 kids. She is a .           Praveena Reyes MD, [place] [address]  [address] [place] [address], [address]  [address] [facility] [address], Angie Miller  [place], [address] [place] [address]  Ph: [phone]   Ph [phone]        ADDENDUM   3/24/2021  [facility] records reviewed. She was seen at [facility] for CAD (s/p CABG; 2 stents to LCX in 2009)     CABG 5/2005 --> LIMA to LAD, SVG to Cx and PDA    Echo 6/17 - LVEF 50-55%    Lexiscan Cardiolite 8/14/18 - small sized (4%) reversible inferoseptal defect. LVEF 67%    Chest CTA 9/14/18 - no evidence acute traumatic injury. Right sided aortic arch.      Cardiac Cath ([facility]) 10/18 - patent grafts (LIMA to LAD, SVG-T to PDA and OM1) - treated medically - if persistent symptoms then consider PCI to pLAD and mLCX

## 2021-04-02 RX ORDER — NITROGLYCERIN 80 MG/1
PATCH TRANSDERMAL
Status: CANCELLED | OUTPATIENT
Start: 2021-04-02

## 2021-04-02 NOTE — TELEPHONE ENCOUNTER
Received x2 faxes from [facility] in [facility]. Called to confirm medication type, unable to LVM. Refill per VO of Dr. Spear Salts:  Last appt: 3/22/2021  Future Appointments   Date Time Provider Maikol Capps   2021 11:00 AM Emma Muñoz MD [facility] AMB       Requested Prescriptions     Signed Prescriptions Disp Refills    nitroglycerin (NITRODUR) 0.4 mg/hr 90 Patch 3     Si Patch by TransDERmal route daily.      Authorizing Provider: Pema Doe     Ordering User: Farzad Capone

## 2021-04-22 RX ORDER — NITROGLYCERIN 80 MG/1
1 PATCH TRANSDERMAL DAILY
Qty: 90 PATCH | Refills: 3 | Status: SHIPPED | OUTPATIENT
Start: 2021-04-22 | End: 2022-05-17 | Stop reason: SDUPTHER

## 2021-05-17 ENCOUNTER — LAB ONLY (OUTPATIENT)
Dept: FAMILY MEDICINE CLINIC | Age: 81
End: 2021-05-17

## 2021-05-17 DIAGNOSIS — E11.69 TYPE 2 DIABETES MELLITUS WITH OTHER SPECIFIED COMPLICATION, UNSPECIFIED WHETHER LONG TERM INSULIN USE (HCC): ICD-10-CM

## 2021-05-17 LAB
EST. AVERAGE GLUCOSE BLD GHB EST-MCNC: 192 MG/DL
HBA1C MFR BLD: 8.3 % (ref 4–5.6)

## 2021-05-18 NOTE — PROGRESS NOTES
Her A1c has increased. Her goal is less than 8. I see you recently saw Dr. Anatoliy Rincon. We may need to make adjustments to medications depending on how long you have been on the Januvia. Please schedule a follow-up appointment to discuss medication changes.

## 2021-06-08 ENCOUNTER — TELEPHONE (OUTPATIENT)
Dept: FAMILY MEDICINE CLINIC | Age: 81
End: 2021-06-08

## 2021-06-08 DIAGNOSIS — E11.69 TYPE 2 DIABETES MELLITUS WITH OTHER SPECIFIED COMPLICATION, UNSPECIFIED WHETHER LONG TERM INSULIN USE (HCC): Primary | ICD-10-CM

## 2021-06-08 NOTE — TELEPHONE ENCOUNTER
----- Message from Darlin Vasquez sent at 6/8/2021  2:18 PM EDT -----  Regarding: Dr. Blu Morton: [phone]  General Message/Vendor Calls    Caller's first and last name: N/A      Reason for call: Podiatrist       Callback required yes/no and why: Yes/Confirm      Best contact number(s):[phone]      Details to clarify the request: Patient is looking for a name of a podiatrist to clip her toe nails.         Darlin Vasquez

## 2021-06-11 NOTE — TELEPHONE ENCOUNTER
Updated Podiatry ref order and routed info over to office for scheduling    Close enc    Thanks  Efrain Finch S/PSR  [facility] Referral Coordinator

## 2021-09-07 ENCOUNTER — OFFICE VISIT (OUTPATIENT)
Dept: FAMILY MEDICINE CLINIC | Age: 81
End: 2021-09-07
Payer: MEDICARE

## 2021-09-07 VITALS — SYSTOLIC BLOOD PRESSURE: 140 MMHG | DIASTOLIC BLOOD PRESSURE: 58 MMHG

## 2021-09-07 DIAGNOSIS — E11.69 TYPE 2 DIABETES MELLITUS WITH OTHER SPECIFIED COMPLICATION, UNSPECIFIED WHETHER LONG TERM INSULIN USE (HCC): Primary | ICD-10-CM

## 2021-09-07 PROBLEM — Z95.5 PRESENCE OF CORONARY ANGIOPLASTY IMPLANT AND GRAFT: Status: ACTIVE | Noted: 2021-09-07

## 2021-09-07 PROBLEM — K21.9 GASTROESOPHAGEAL REFLUX DISEASE: Status: ACTIVE | Noted: 2021-09-07

## 2021-09-07 PROBLEM — J30.9 ALLERGIC RHINITIS: Status: ACTIVE | Noted: 2021-09-07

## 2021-09-07 PROCEDURE — 3051F HG A1C>EQUAL 7.0%<8.0%: CPT | Performed by: STUDENT IN AN ORGANIZED HEALTH CARE EDUCATION/TRAINING PROGRAM

## 2021-09-07 PROCEDURE — G8400 PT W/DXA NO RESULTS DOC: HCPCS | Performed by: STUDENT IN AN ORGANIZED HEALTH CARE EDUCATION/TRAINING PROGRAM

## 2021-09-07 PROCEDURE — G8432 DEP SCR NOT DOC, RNG: HCPCS | Performed by: STUDENT IN AN ORGANIZED HEALTH CARE EDUCATION/TRAINING PROGRAM

## 2021-09-07 PROCEDURE — 1101F PT FALLS ASSESS-DOCD LE1/YR: CPT | Performed by: STUDENT IN AN ORGANIZED HEALTH CARE EDUCATION/TRAINING PROGRAM

## 2021-09-07 PROCEDURE — G8427 DOCREV CUR MEDS BY ELIG CLIN: HCPCS | Performed by: STUDENT IN AN ORGANIZED HEALTH CARE EDUCATION/TRAINING PROGRAM

## 2021-09-07 PROCEDURE — G8419 CALC BMI OUT NRM PARAM NOF/U: HCPCS | Performed by: STUDENT IN AN ORGANIZED HEALTH CARE EDUCATION/TRAINING PROGRAM

## 2021-09-07 PROCEDURE — G8536 NO DOC ELDER MAL SCRN: HCPCS | Performed by: STUDENT IN AN ORGANIZED HEALTH CARE EDUCATION/TRAINING PROGRAM

## 2021-09-07 PROCEDURE — 99213 OFFICE O/P EST LOW 20 MIN: CPT | Performed by: STUDENT IN AN ORGANIZED HEALTH CARE EDUCATION/TRAINING PROGRAM

## 2021-09-07 PROCEDURE — G8753 SYS BP > OR = 140: HCPCS | Performed by: STUDENT IN AN ORGANIZED HEALTH CARE EDUCATION/TRAINING PROGRAM

## 2021-09-07 PROCEDURE — 1090F PRES/ABSN URINE INCON ASSESS: CPT | Performed by: STUDENT IN AN ORGANIZED HEALTH CARE EDUCATION/TRAINING PROGRAM

## 2021-09-07 PROCEDURE — G8754 DIAS BP LESS 90: HCPCS | Performed by: STUDENT IN AN ORGANIZED HEALTH CARE EDUCATION/TRAINING PROGRAM

## 2021-09-07 NOTE — PROGRESS NOTES
[address] [facility] [address] [place] [address]   Office [phone]  Fax [phone]     9/7/2021   Chief Complaint   Patient presents with    Diabetes     Patient presents for a diabetes follow up. HPI:  Dennie Buba is a 68 y.o. female who presents to clinic today for diabetes follow up. DM: On Januvia 100mg daily. This AM BG 89, but usually between 100-150. Follows with eye doctor yearly, last seen this spring. Drinking sparkling water, soda/ginger ale occasionally. Eating sweets once per week. Trying to improve diet. Eating fresh vegetables and cooking with olive oil. Could not tolerate metformin due to GI upset. Not tolerate Jardiance/epagliflozin due to nausea and feeling sick, which resulted in ED visit. Reports having pneumococcal vaccine twice, but does not have records. Patients past medical, surgical and family histories were reviewed. Allergies and Medications reviewed and updated. Review of Systems   Respiratory: Negative for sputum production. Gastrointestinal: Negative for abdominal pain. Neurological: Negative for tingling and sensory change. Objective:  Vitals:    09/07/21 1424   BP: (!) 140/58     There is no height or weight on file to calculate BMI. Physical Exam  General: Patient alert and oriented and in NAD  HEENT: PER/EOMI, no conjunctival pallor or scleral icterus. Heart: Regular rate and rhythm, No murmurs, rubs or gallops.   2+ peripheral pulses  Lungs: Clear to auscultation bilaterally, no wheezing, rales or rhonchi  Abd: +BS, non-tender, non-distended  Ext: No edema  Skin: No rashes or lesions noted on exposed skin,  Psych: Appropriate mood and affect  Diabetic foot exam:      Monofilament test:  good  Skin integrity:  intact without lesions  Vascular:  good circulation  Motor:  moves all toes, strength seems ok      Reviewed labs:   Lab Results   Component Value Date/Time    Hemoglobin A1c 8.3 (H) 05/17/2021 11:38 AM Assessment and Plan:  1. Type 2 diabetes mellitus with other specified complication, unspecified whether long term insulin use (HCC)  - On Januvia 100mg daily  - Goal A1C <8 given age  - Consider adding on Trulicity/ozempic  - Continue lifestyle modifications  - HEMOGLOBIN A1C WITH EAG  - MICROALBUMIN, UR, RAND W/ MICROALB/CREAT RATIO        Follow-up and Dispositions    · Return in about 4 months (around 1/7/2022). I have discussed the aforementioned diagnoses and plan with the patient in detail. I have provided information in person and/or in AVS. All questions answered prior to discharge.      I discussed this patient with Dr. Lisbeth Schumacher (Attending Physician)   Signed By:  Anil Jackson MD     Family Medicine Resident

## 2021-09-08 LAB
CREAT UR-MCNC: <13 MG/DL
EST. AVERAGE GLUCOSE BLD GHB EST-MCNC: 171 MG/DL
HBA1C MFR BLD: 7.6 % (ref 4–5.6)
MICROALBUMIN UR-MCNC: 2.75 MG/DL
MICROALBUMIN/CREAT UR-RTO: NORMAL MG/G (ref 0–30)

## 2021-09-23 ENCOUNTER — OFFICE VISIT (OUTPATIENT)
Dept: CARDIOLOGY CLINIC | Age: 81
End: 2021-09-23
Payer: MEDICARE

## 2021-09-23 VITALS
HEART RATE: 56 BPM | BODY MASS INDEX: 27.14 KG/M2 | HEIGHT: 64 IN | DIASTOLIC BLOOD PRESSURE: 68 MMHG | WEIGHT: 159 LBS | SYSTOLIC BLOOD PRESSURE: 122 MMHG | OXYGEN SATURATION: 98 %

## 2021-09-23 DIAGNOSIS — I10 ESSENTIAL HYPERTENSION: ICD-10-CM

## 2021-09-23 DIAGNOSIS — R01.1 MURMUR: ICD-10-CM

## 2021-09-23 DIAGNOSIS — Z95.1 HX OF CABG: ICD-10-CM

## 2021-09-23 DIAGNOSIS — I25.810 CORONARY ARTERY DISEASE INVOLVING CORONARY BYPASS GRAFT OF NATIVE HEART WITHOUT ANGINA PECTORIS: Primary | ICD-10-CM

## 2021-09-23 PROCEDURE — 1090F PRES/ABSN URINE INCON ASSESS: CPT | Performed by: SPECIALIST

## 2021-09-23 PROCEDURE — G8400 PT W/DXA NO RESULTS DOC: HCPCS | Performed by: SPECIALIST

## 2021-09-23 PROCEDURE — G8419 CALC BMI OUT NRM PARAM NOF/U: HCPCS | Performed by: SPECIALIST

## 2021-09-23 PROCEDURE — G8536 NO DOC ELDER MAL SCRN: HCPCS | Performed by: SPECIALIST

## 2021-09-23 PROCEDURE — G8427 DOCREV CUR MEDS BY ELIG CLIN: HCPCS | Performed by: SPECIALIST

## 2021-09-23 PROCEDURE — G8752 SYS BP LESS 140: HCPCS | Performed by: SPECIALIST

## 2021-09-23 PROCEDURE — G8510 SCR DEP NEG, NO PLAN REQD: HCPCS | Performed by: SPECIALIST

## 2021-09-23 PROCEDURE — 1101F PT FALLS ASSESS-DOCD LE1/YR: CPT | Performed by: SPECIALIST

## 2021-09-23 PROCEDURE — G8754 DIAS BP LESS 90: HCPCS | Performed by: SPECIALIST

## 2021-09-23 PROCEDURE — 99214 OFFICE O/P EST MOD 30 MIN: CPT | Performed by: SPECIALIST

## 2021-09-23 NOTE — PROGRESS NOTES
Vinita Chris MD. [facility] [place]              Patient: Evette Alvarez  : 1940      Today's Date: 2021          HISTORY OF PRESENT ILLNESS:     History of Present Illness:     She has a history of CAD/CABG. No recent cardiac issues. No CP or SOB. No syncope. She feels well cardiac wise. BP OK at home. PAST MEDICAL HISTORY:     Past Medical History:   Diagnosis Date    CAD (coronary artery disease)     Diabetes ([address]     Dyslipidemia     Endocrine disease     Hx of CABG     Hypertension     S/P triple vessel bypass            MEDICATIONS:     Current Outpatient Medications   Medication Sig Dispense Refill    SITagliptin (JANUVIA) 100 mg tablet Take 1 Tablet by mouth daily. 90 Tablet 0    nitroglycerin (NITRODUR) 0.4 mg/hr 1 Patch by TransDERmal route daily. 90 Patch 3    aspirin delayed-release 81 mg tablet Take 81 mg by mouth daily.  ondansetron (Zofran ODT) 4 mg disintegrating tablet 1 Tab by SubLINGual route every eight (8) hours as needed for Nausea or Vomiting. 20 Tab 0    amLODIPine (NORVASC) 10 mg tablet Take 10 mg by mouth daily.  fosinopriL (MONOPRIL) 10 mg tablet Take 1 Tab by mouth daily.  90 Tab 1    isosorbide mononitrate ER (IMDUR) 60 mg CR tablet 60 mg = 1 tab each dose, PO, daily, # 90 tab, 3 Refills, Pharmacy: [address] 30 Tab 0    omeprazole (PRILOSEC) 20 mg capsule 20 mg = 1 CAP each dose, PO, daily, # 90 CAP, 3 Refills, Pharmacy: [address] 60 Cap 0    rosuvastatin (CRESTOR) 40 mg tablet TAKE 1 TABLET BY MOUTH ONCE DAILY 60 Tab 0       Allergies   Allergen Reactions    Jardiance [Empagliflozin] Nausea Only     HYPOTENSION    Metformin Diarrhea             SOCIAL HISTORY:     Social History     Tobacco Use    Smoking status: Never Smoker    Smokeless tobacco: Never Used   Substance Use Topics    Alcohol use: Not Currently    Drug use: Never         FAMILY HISTORY:     FH non-contributory         REVIEW OF SYMPTOMS:     Review of Symptoms:  Constitutional: Negative for fever, chills  HEENT: Negative for nosebleeds, tinnitus, and vision changes. Respiratory: Negative for cough, wheezing  Cardiovascular: Negative for orthopnea, claudication, syncope, and PND. Gastrointestinal: Negative for abdominal pain, diarrhea, melena. Genitourinary: Negative for dysuria  Musculoskeletal: Negative for myalgias. Skin: Negative for rash  Heme: No problems bleeding. Neurological: Negative for speech change and focal weakness. PHYSICAL EXAM:     Physical Exam:  Visit Vitals  /68 (BP 1 Location: Left upper arm, BP Patient Position: Sitting, BP Cuff Size: Adult)   Pulse (!) 56   Ht 5' 4\" (1.626 m)   Wt 159 lb (72.1 kg)   SpO2 98%   BMI 27.29 kg/m²     Patient appears generally well, mood and affect are appropriate and pleasant. HEENT:  Hearing intact, non-icteric, normocephalic, atraumatic. Neck Exam: Supple, No JVD or carotid bruits. Lung Exam: Clear to auscultation, even breath sounds. Cardiac Exam: Regular rate and rhythm with 2/6 systolic murmur  Abdomen: Soft, non-tender   Extremities: Moves all ext well. No lower extremity edema. MSKTL: Overall good ROM ext  Skin: No significant rashes  Psych: Appropriate affect  Neuro - Grossly intact      LABS / OTHER STUDIES reviewed:       Lab Results   Component Value Date/Time    Sodium 135 (L) 02/10/2021 01:18 PM    Potassium 4.2 02/10/2021 01:18 PM    Chloride 102 02/10/2021 01:18 PM    CO2 26 02/10/2021 01:18 PM    Anion gap 7 02/10/2021 01:18 PM    Glucose 315 (H) 02/10/2021 01:18 PM    BUN 17 02/10/2021 01:18 PM    Creatinine 1.06 (H) 02/10/2021 01:18 PM    BUN/Creatinine ratio 16 02/10/2021 01:18 PM    GFR est AA >60 02/10/2021 01:18 PM    GFR est non-AA 50 (L) 02/10/2021 01:18 PM    Calcium 9.1 02/10/2021 01:18 PM    Bilirubin, total 0.3 02/10/2021 01:18 PM    Alk.  phosphatase 93 02/10/2021 01:18 PM    Protein, total 7.2 02/10/2021 01:18 PM    Albumin 3.5 02/10/2021 01:18 PM    Globulin 3.7 02/10/2021 01:18 PM    A-G Ratio 0.9 (L) 02/10/2021 01:18 PM    ALT (SGPT) 17 02/10/2021 01:18 PM    AST (SGOT) 15 02/10/2021 01:18 PM     Lab Results   Component Value Date/Time    WBC 10.3 02/10/2021 01:18 PM    HGB 13.3 02/10/2021 01:18 PM    HCT 39.8 02/10/2021 01:18 PM    PLATELET 926 88/73/9366 01:18 PM    MCV 83.1 02/10/2021 01:18 PM       Lab Results   Component Value Date/Time    Cholesterol, total 149 01/15/2021 09:19 AM    HDL Cholesterol 53 01/15/2021 09:19 AM    LDL, calculated 83.8 01/15/2021 09:19 AM    VLDL, calculated 12.2 01/15/2021 09:19 AM    Triglyceride 61 01/15/2021 09:19 AM    CHOL/HDL Ratio 2.8 01/15/2021 09:19 AM       Lab Results   Component Value Date/Time    TSH 1.51 02/16/2021 11:00 AM     Lab Results   Component Value Date/Time    Hemoglobin A1c 7.6 (H) 09/07/2021 02:40 PM             CARDIAC DIAGNOSTICS:     Cardiac Evaluation Includes:  I reviewed the results below. She was seen at [facility] for CAD (s/p CABG; 2 stents to LCX in 2009)     CABG 5/2005 --> LIMA to LAD, SVG to Cx and PDA    Echo 6/17 - LVEF 50-55%    Lexiscan Cardiolite 8/14/18 - small sized (4%) reversible inferoseptal defect. LVEF 67%    Chest CTA 9/14/18 - no evidence acute traumatic injury. Right sided aortic arch. Cardiac Cath ([facility]) 10/18 - patent grafts (LIMA to LAD, SVG-T to PDA and OM1) - treated medically - if persistent symptoms then consider PCI to pLAD and mLCX      EKG 2/10/21 - NSR, old inferior infarct - I viewed myself       ASSESSMENT AND PLAN:     Assessment and Plan:  1) CAD  - History of CABG (was managed at [facility] for years)   - Cardiac Cath ([facility]) 10/18 - patent grafts (LIMA to LAD, SVG-T to PDA and OM1) - treated medically - if persistent symptoms then consider PCI to pLAD and mLCX  - She feels well without angina.     - cont statin, ASA, CBB  - check an echo next visit (murmur)     2) HTN  - BP OK - cont meds     3) Dyslipidemia   - recent LDL OK   - cont statin     4) See me back in 6 months (her preference)     Lives with  (COPD). Has 3 kids. She was a . Terrell Bolden MD, [address]  Ph: [phone]    [phone]      ADDENDUM   10/14/2021  10/14/21    ECHO ADULT COMPLETE 10/14/2021 10/14/2021    Interpretation Summary  · LV: Calculated LVEF is 60%. Visually measured ejection fraction. Normal cavity size and systolic function (ejection fraction normal). Upper normal wall thickness. Age-appropriate left ventricular diastolic function. · LA: Mildly dilated left atrium. Left Atrium volume index is 37 mL/m2.     Signed by: Taisha Ervin MD on 10/14/2021  3:26 PM    Will have nurse call

## 2021-09-23 NOTE — PROGRESS NOTES
Room 5    Visit Vitals  /68 (BP 1 Location: Left upper arm, BP Patient Position: Sitting, BP Cuff Size: Adult)   Pulse (!) 56   Ht 5' 4\" (1.626 m)   Wt 159 lb (72.1 kg)   SpO2 98%   BMI 27.29 kg/m²         Chest pain: no  Shortness of breath: no  Edema: no  Palpitations: no  Dizziness: no    New diagnosis/Surgeries: no    ER/Hospitalizations: no    Refills: ?

## 2021-10-14 ENCOUNTER — ANCILLARY PROCEDURE (OUTPATIENT)
Dept: CARDIOLOGY CLINIC | Age: 81
End: 2021-10-14
Payer: MEDICARE

## 2021-10-14 VITALS
WEIGHT: 159 LBS | SYSTOLIC BLOOD PRESSURE: 130 MMHG | BODY MASS INDEX: 27.14 KG/M2 | DIASTOLIC BLOOD PRESSURE: 58 MMHG | HEIGHT: 64 IN

## 2021-10-14 DIAGNOSIS — I25.810 CORONARY ARTERY DISEASE INVOLVING CORONARY BYPASS GRAFT OF NATIVE HEART WITHOUT ANGINA PECTORIS: ICD-10-CM

## 2021-10-14 DIAGNOSIS — R01.1 MURMUR: ICD-10-CM

## 2021-10-14 DIAGNOSIS — I10 ESSENTIAL HYPERTENSION: ICD-10-CM

## 2021-10-14 LAB
ECHO AO ASC DIAM: 3.07 CM
ECHO AO ROOT DIAM: 3.04 CM
ECHO AV AREA PEAK VELOCITY: 2.36 CM2
ECHO AV AREA VTI: 2.53 CM2
ECHO AV AREA/BSA PEAK VELOCITY: 1.3 CM2/M2
ECHO AV AREA/BSA VTI: 1.4 CM2/M2
ECHO AV MEAN GRADIENT: 3.28 MMHG
ECHO AV PEAK GRADIENT: 7.37 MMHG
ECHO AV PEAK VELOCITY: 135.71 CM/S
ECHO AV VTI: 29.53 CM
ECHO LA AREA 4C: 21.47 CM2
ECHO LA MAJOR AXIS: 4.43 CM
ECHO LA MINOR AXIS: 2.5 CM
ECHO LA VOL 4C: 64.33 ML (ref 22–52)
ECHO LA VOL BP: 65.23 ML (ref 22–52)
ECHO LA VOL/BSA BIPLANE: 36.85 ML/M2 (ref 16–28)
ECHO LA VOLUME INDEX A4C: 36.34 ML/M2 (ref 16–28)
ECHO LV E' LATERAL VELOCITY: 7.87 CM/S
ECHO LV E' SEPTAL VELOCITY: 5.58 CM/S
ECHO LV INTERNAL DIMENSION DIASTOLIC: 4.85 CM (ref 3.9–5.3)
ECHO LV INTERNAL DIMENSION SYSTOLIC: 3.17 CM
ECHO LV IVSD: 0.86 CM (ref 0.6–0.9)
ECHO LV MASS 2D: 132.1 G (ref 67–162)
ECHO LV MASS INDEX 2D: 74.6 G/M2 (ref 43–95)
ECHO LV POSTERIOR WALL DIASTOLIC: 0.77 CM (ref 0.6–0.9)
ECHO LVOT DIAM: 1.92 CM
ECHO LVOT PEAK GRADIENT: 4.93 MMHG
ECHO LVOT PEAK VELOCITY: 111.03 CM/S
ECHO LVOT SV: 74.8 ML
ECHO LVOT VTI: 25.95 CM
ECHO MV A VELOCITY: 75.73 CM/S
ECHO MV E DECELERATION TIME (DT): 245.63 MS
ECHO MV E VELOCITY: 67.89 CM/S
ECHO MV E/A RATIO: 0.9
ECHO MV E/E' LATERAL: 8.63
ECHO MV E/E' RATIO (AVERAGED): 10.4
ECHO MV E/E' SEPTAL: 12.17
ECHO MV MAX VELOCITY: 83.49 CM/S
ECHO MV MEAN GRADIENT: 0.93 MMHG
ECHO MV PEAK GRADIENT: 2.79 MMHG
ECHO MV PRESSURE HALF TIME (PHT): 71.23 MS
ECHO MV VTI: 30.77 CM
ECHO RA AREA 4C: 14.13 CM2
ECHO RV INTERNAL DIMENSION: 3.82 CM
ECHO RV TAPSE: 1.68 CM (ref 1.5–2)

## 2021-10-14 PROCEDURE — 93306 TTE W/DOPPLER COMPLETE: CPT | Performed by: SPECIALIST

## 2021-11-20 ENCOUNTER — APPOINTMENT (OUTPATIENT)
Dept: GENERAL RADIOLOGY | Age: 81
End: 2021-11-20
Attending: NURSE PRACTITIONER
Payer: MEDICARE

## 2021-11-20 ENCOUNTER — HOSPITAL ENCOUNTER (EMERGENCY)
Age: 81
Discharge: HOME | End: 2021-11-20
Attending: EMERGENCY MEDICINE
Payer: MEDICARE

## 2021-11-20 VITALS
HEIGHT: 64 IN | SYSTOLIC BLOOD PRESSURE: 143 MMHG | OXYGEN SATURATION: 97 % | RESPIRATION RATE: 15 BRPM | HEART RATE: 51 BPM | TEMPERATURE: 97.3 F | WEIGHT: 159 LBS | DIASTOLIC BLOOD PRESSURE: 66 MMHG | BODY MASS INDEX: 27.14 KG/M2

## 2021-11-20 DIAGNOSIS — M79.10 MYALGIA: Primary | ICD-10-CM

## 2021-11-20 LAB
ALBUMIN SERPL-MCNC: 3.6 G/DL (ref 3.5–5)
ALBUMIN/GLOB SERPL: 1 {RATIO} (ref 1.1–2.2)
ALP SERPL-CCNC: 91 U/L (ref 45–117)
ALT SERPL-CCNC: 15 U/L (ref 12–78)
ANION GAP SERPL CALC-SCNC: 2 MMOL/L (ref 5–15)
APPEARANCE UR: CLEAR
AST SERPL-CCNC: 17 U/L (ref 15–37)
BACTERIA URNS QL MICRO: NEGATIVE /HPF
BASOPHILS # BLD: 0.1 K/UL (ref 0–0.1)
BASOPHILS NFR BLD: 1 % (ref 0–1)
BILIRUB SERPL-MCNC: 0.3 MG/DL (ref 0.2–1)
BILIRUB UR QL: NEGATIVE
BNP SERPL-MCNC: 147 PG/ML
BUN SERPL-MCNC: 20 MG/DL (ref 6–20)
BUN/CREAT SERPL: 21 (ref 12–20)
CALCIUM SERPL-MCNC: 9.3 MG/DL (ref 8.5–10.1)
CHLORIDE SERPL-SCNC: 109 MMOL/L (ref 97–108)
CO2 SERPL-SCNC: 28 MMOL/L (ref 21–32)
COLOR UR: ABNORMAL
COMMENT, HOLDF: NORMAL
CREAT SERPL-MCNC: 0.94 MG/DL (ref 0.55–1.02)
DIFFERENTIAL METHOD BLD: ABNORMAL
EOSINOPHIL # BLD: 0.2 K/UL (ref 0–0.4)
EOSINOPHIL NFR BLD: 3 % (ref 0–7)
EPITH CASTS URNS QL MICRO: ABNORMAL /LPF
ERYTHROCYTE [DISTWIDTH] IN BLOOD BY AUTOMATED COUNT: 13.1 % (ref 11.5–14.5)
GLOBULIN SER CALC-MCNC: 3.6 G/DL (ref 2–4)
GLUCOSE SERPL-MCNC: 260 MG/DL (ref 65–100)
GLUCOSE UR STRIP.AUTO-MCNC: 250 MG/DL
HCT VFR BLD AUTO: 39.8 % (ref 35–47)
HGB BLD-MCNC: 12.8 G/DL (ref 11.5–16)
HGB UR QL STRIP: NEGATIVE
HYALINE CASTS URNS QL MICRO: ABNORMAL /LPF (ref 0–5)
IMM GRANULOCYTES # BLD AUTO: 0 K/UL (ref 0–0.04)
IMM GRANULOCYTES NFR BLD AUTO: 1 % (ref 0–0.5)
KETONES UR QL STRIP.AUTO: NEGATIVE MG/DL
LEUKOCYTE ESTERASE UR QL STRIP.AUTO: ABNORMAL
LYMPHOCYTES # BLD: 3 K/UL (ref 0.8–3.5)
LYMPHOCYTES NFR BLD: 35 % (ref 12–49)
MCH RBC QN AUTO: 26.9 PG (ref 26–34)
MCHC RBC AUTO-ENTMCNC: 32.2 G/DL (ref 30–36.5)
MCV RBC AUTO: 83.8 FL (ref 80–99)
MONOCYTES # BLD: 0.7 K/UL (ref 0–1)
MONOCYTES NFR BLD: 8 % (ref 5–13)
NEUTS SEG # BLD: 4.5 K/UL (ref 1.8–8)
NEUTS SEG NFR BLD: 52 % (ref 32–75)
NITRITE UR QL STRIP.AUTO: NEGATIVE
NRBC # BLD: 0 K/UL (ref 0–0.01)
NRBC BLD-RTO: 0 PER 100 WBC
PH UR STRIP: 5.5 [PH] (ref 5–8)
PLATELET # BLD AUTO: 215 K/UL (ref 150–400)
PMV BLD AUTO: 10 FL (ref 8.9–12.9)
POTASSIUM SERPL-SCNC: 3.6 MMOL/L (ref 3.5–5.1)
PROT SERPL-MCNC: 7.2 G/DL (ref 6.4–8.2)
PROT UR STRIP-MCNC: ABNORMAL MG/DL
RBC # BLD AUTO: 4.75 M/UL (ref 3.8–5.2)
RBC #/AREA URNS HPF: ABNORMAL /HPF (ref 0–5)
SAMPLES BEING HELD,HOLD: NORMAL
SODIUM SERPL-SCNC: 139 MMOL/L (ref 136–145)
SP GR UR REFRACTOMETRY: 1.02 (ref 1–1.03)
TROPONIN-HIGH SENSITIVITY: 8 NG/L (ref 0–51)
UA: UC IF INDICATED,UAUC: ABNORMAL
UROBILINOGEN UR QL STRIP.AUTO: 0.2 EU/DL (ref 0.2–1)
WBC # BLD AUTO: 8.5 K/UL (ref 3.6–11)
WBC URNS QL MICRO: ABNORMAL /HPF (ref 0–4)

## 2021-11-20 PROCEDURE — 83880 ASSAY OF NATRIURETIC PEPTIDE: CPT

## 2021-11-20 PROCEDURE — 71046 X-RAY EXAM CHEST 2 VIEWS: CPT

## 2021-11-20 PROCEDURE — 85025 COMPLETE CBC W/AUTO DIFF WBC: CPT

## 2021-11-20 PROCEDURE — 84484 ASSAY OF TROPONIN QUANT: CPT

## 2021-11-20 PROCEDURE — 80053 COMPREHEN METABOLIC PANEL: CPT

## 2021-11-20 PROCEDURE — 99283 EMERGENCY DEPT VISIT LOW MDM: CPT

## 2021-11-20 PROCEDURE — 81001 URINALYSIS AUTO W/SCOPE: CPT

## 2021-11-20 PROCEDURE — 93005 ELECTROCARDIOGRAM TRACING: CPT

## 2021-11-20 PROCEDURE — 36415 COLL VENOUS BLD VENIPUNCTURE: CPT

## 2021-11-20 PROCEDURE — 87086 URINE CULTURE/COLONY COUNT: CPT

## 2021-11-20 NOTE — ED TRIAGE NOTES
Pt arrives to ED for \"not feeling well\". Pt reports bodyaches starting yesterday. Reports \"fullness\" in chest last night. Pt states she feels dehydrated.

## 2021-11-20 NOTE — ED PROVIDER NOTES
This is a 21-year-old female who presents ambulatory to the emergency room with complaints of generally \"not feeling well. \" Patient states she started developing body aches and pains yesterday. Reports feeling \"fullness in her chest.\" States she did take 1 sublingual nitroglycerin that \"kind of let up on the pain. \" Patient states she went to sleep woke up this morning and was feeling \"dehydrated. \" States the feeling of general malaise has worsened over the period of the day prompting an emergency room visit. States she is continues to have the full feeling in her chest since this afternoon. No shortness of breath, no dizziness, no nausea or vomiting. No fevers or chills. Has a long history of cardiac stents in the past. Does not take any blood thinners. Has been fully vaccinated. There are no further complaints at this time. Ayaan Wolff MD  Past Medical History:  No date: CAD (coronary artery disease)  No date: Diabetes [facility])  No date: Dyslipidemia  No date: Endocrine disease  No date: Hx of CABG  No date: Hypertension  2005: S/P triple vessel bypass  No past surgical history on file. Past Medical History:   Diagnosis Date    CAD (coronary artery disease)     [address] [address]     Dyslipidemia     Endocrine disease     Hx of CABG     Hypertension     S/P triple vessel bypass 2005       No past surgical history on file. No family history on file.     Social History     Socioeconomic History    Marital status:      Spouse name: Not on file    Number of children: Not on file    Years of education: Not on file    Highest education level: Not on file   Occupational History    Not on file   Tobacco Use    Smoking status: Never Smoker    Smokeless tobacco: Never Used   Substance and Sexual Activity    Alcohol use: Not Currently    Drug use: Never    Sexual activity: Never   Other Topics Concern    Not on file   Social History Narrative    Not on file     Social Determinants of Health     Financial Resource Strain:     Difficulty of Paying Living Expenses: Not on file   Food Insecurity:     Worried About Running Out of Food in the Last Year: Not on file    Caitlin of Food in the Last Year: Not on file   Transportation Needs:     Lack of Transportation (Medical): Not on file    Lack of Transportation (Non-Medical): Not on file   Physical Activity:     Days of Exercise per Week: Not on file    Minutes of Exercise per Session: Not on file   Stress:     Feeling of Stress : Not on file   Social Connections:     Frequency of Communication with Friends and Family: Not on file    Frequency of Social Gatherings with Friends and Family: Not on file    Attends [employer] Services: Not on file    Active Member of [address] [place] [address] or Organizations: Not on file    Attends Club or Organization Meetings: Not on file    Marital Status: Not on file   Intimate Partner Violence:     Fear of Current or Ex-Partner: Not on file    Emotionally Abused: Not on file    Physically Abused: Not on file    Sexually Abused: Not on file   Housing Stability:     Unable to Pay for Housing in the Last Year: Not on file    Number of [place] in the Last Year: Not on file    Unstable Housing in the Last Year: Not on file         ALLERGIES: Jardiance [empagliflozin] and Metformin    Review of Systems   Constitutional: Positive for fatigue. Negative for appetite change, chills, diaphoresis and fever. HENT: Negative for congestion, ear discharge, ear pain, sinus pressure, sinus pain, sore throat and trouble swallowing. Eyes: Negative for photophobia, pain, redness and visual disturbance. Respiratory: Negative for chest tightness, shortness of breath and wheezing. Cardiovascular: Positive for chest pain. Negative for palpitations. Gastrointestinal: Negative for abdominal distention, abdominal pain, nausea and vomiting. Endocrine: Negative.     Genitourinary: Negative for difficulty urinating, flank pain, frequency and urgency. Musculoskeletal: Positive for myalgias. Negative for back pain, neck pain and neck stiffness. Skin: Negative for color change, pallor, rash and wound. Allergic/Immunologic: Negative. Neurological: Negative for dizziness, speech difficulty, weakness and headaches. Hematological: Does not bruise/bleed easily. Psychiatric/Behavioral: Negative for behavioral problems. The patient is not nervous/anxious. Vitals:    11/20/21 1425   BP: (!) 187/65   Pulse: (!) 51   Resp: 18   Temp: 97.3 °F (36.3 °C)   SpO2: 97%   Weight: 72.1 kg (159 lb)   Height: 5' 4\" (1.626 m)            Physical Exam  Vitals and nursing note reviewed. Constitutional:       General: She is not in acute distress. Appearance: Normal appearance. She is well-developed. She is not ill-appearing. HENT:      Head: Normocephalic and atraumatic. Right Ear: External ear normal.      Left Ear: External ear normal.      Nose: Nose normal. No congestion. Mouth/Throat:      Mouth: Mucous membranes are moist.   Eyes:      General:         Right eye: No discharge. Left eye: No discharge. Conjunctiva/sclera: Conjunctivae normal.      Pupils: Pupils are equal, round, and reactive to light. Neck:      Vascular: No JVD. Trachea: No tracheal deviation. Cardiovascular:      Rate and Rhythm: Regular rhythm. Bradycardia present. Pulses: Normal pulses. Heart sounds: Normal heart sounds. No murmur heard. No gallop. Pulmonary:      Effort: Pulmonary effort is normal. No respiratory distress. Breath sounds: Normal breath sounds. No wheezing or rales. Chest:      Chest wall: No tenderness. Abdominal:      General: Bowel sounds are normal. There is no distension. Palpations: Abdomen is soft. Tenderness: There is no abdominal tenderness. There is no guarding or rebound. Genitourinary:     Comments: Negative    Musculoskeletal:         General: No tenderness.  Normal range of motion. Cervical back: Normal range of motion and neck supple. Skin:     General: Skin is warm and dry. Coloration: Skin is not pale. Findings: No erythema or rash. Neurological:      General: No focal deficit present. Mental Status: She is alert and oriented to person, place, and time. Motor: No weakness. Coordination: Coordination normal.   Psychiatric:         Mood and Affect: Mood normal.         Behavior: Behavior normal.         Thought Content: Thought content normal.         Judgment: Judgment normal.          MDM  Number of Diagnoses or Management Options  Myalgia: new and requires workup  Diagnosis management comments: Differential diagnosis includes urinary tract infection, MI, pneumonia and others. After physical assessment and review of imaging and laboratory data, patient was discharged home and will follow up with PCP. Follow-up with cardiology as an outpatient. Return to the emergency room with worsening symptoms. Patient in agreement with plan of care.        Amount and/or Complexity of Data Reviewed  Clinical lab tests: ordered and reviewed  Tests in the radiology section of CPT®: ordered and reviewed         Labs Reviewed   CBC WITH AUTOMATED DIFF - Abnormal; Notable for the following components:       Result Value    IMMATURE GRANULOCYTES 1 (*)     All other components within normal limits   METABOLIC PANEL, COMPREHENSIVE - Abnormal; Notable for the following components:    Chloride 109 (*)     Anion gap 2 (*)     Glucose 260 (*)     BUN/Creatinine ratio 21 (*)     GFR est non-AA 58 (*)     A-G Ratio 1.0 (*)     All other components within normal limits   URINALYSIS W/ REFLEX CULTURE - Abnormal; Notable for the following components:    Protein TRACE (*)     Glucose 250 (*)     Leukocyte Esterase TRACE (*)     UA:UC IF INDICATED URINE CULTURE ORDERED (*)     All other components within normal limits   CULTURE, URINE   SAMPLES BEING HELD   NT-PRO BNP TROPONIN-HIGH SENSITIVITY     XR CHEST PA LAT    Result Date: 11/20/2021  No acute intrathoracic disease. 6:35 PM  Pt has been reexamined. Pt has no new complaints, changes or physical findings. Care plan outlined and precautions discussed. All available results were reviewed with pt. All medications were reviewed with pt. All of pt's questions and concerns were addressed. Pt agrees to F/U as instructed and agrees to return to ED upon further deterioration. Pt is ready to go home. Taylor Hogan NP    Please note that this dictation was completed with [employer], the [employer] voice recognition software. Quite often unanticipated grammatical, syntax, homophones, and other interpretive errors are inadvertently transcribed by the computer software. Please disregard these errors. Please excuse any errors that have escaped final proofreading. Thank you.     Procedures

## 2021-11-21 LAB
BACTERIA SPEC CULT: NORMAL
SERVICE CMNT-IMP: NORMAL

## 2021-11-22 LAB
ATRIAL RATE: 52 BPM
CALCULATED P AXIS, ECG09: 64 DEGREES
CALCULATED R AXIS, ECG10: 15 DEGREES
CALCULATED T AXIS, ECG11: 106 DEGREES
DIAGNOSIS, 93000: NORMAL
P-R INTERVAL, ECG05: 204 MS
Q-T INTERVAL, ECG07: 434 MS
QRS DURATION, ECG06: 92 MS
QTC CALCULATION (BEZET), ECG08: 403 MS
VENTRICULAR RATE, ECG03: 52 BPM

## 2021-12-16 NOTE — PROGRESS NOTES
Subjective   CC:  Lamar Pham is a 68 y.o. female who presents for [facility]/hospital f/u. Feeling sick: Pt presented to ED on 11/20/21 for generalized body aches. Given her extensive cardiac hx, including CABG, was evaluated for underlying causes to her symptoms. Workup included cardiac w/u (trop, BNP, CXR) which was unrevealing, UA which showed trace LE though UCx neg, CBC, CMP which were unremarkable as well. Pt was stable for d/c from emergency room. Since admission, pt has not had any episodes of chest pain, occasionally has dyspnea if she has caffeinated beverages but otherwise does not have any dyspnea. Has been at her baseline activity level. Saw her cardiologist (Dr. Flakito Pineda) today who presented the option of Edwardo Figueroa, but she does not feel that this episode was similar to past episodes of cardiac chest pain for her. Right shoulder pain: Pt states she has been diagnosed with right shoulder OA in the past, had received two corticosteroid injections for her OA/bursitis. Felt this helped her significantly. States she has recently been experiencing right shoulder pain again, especially with movement. Has been using Biofreeze and heat for pain, which has been helping with pain. Review of Systems   Constitutional: Negative for activity change, chills and fever. HENT: Negative for congestion and sore throat. Respiratory: Negative for cough, chest tightness and shortness of breath. Cardiovascular: Negative for chest pain and leg swelling. Gastrointestinal: Negative for abdominal pain, constipation, diarrhea, nausea and vomiting. Musculoskeletal: Positive for arthralgias (right shoulder). Negative for myalgias. Skin: Negative for rash. Neurological: Negative for dizziness, weakness and headaches.        Past Medical History  Past Medical History:   Diagnosis Date    CAD (coronary artery disease)     Diabetes ([facility] [address]     Dyslipidemia     Endocrine disease     Hx of CABG  Hypertension     S/P triple vessel bypass 2005       Current Medications  Current Outpatient Medications   Medication Sig Dispense Refill    SITagliptin (JANUVIA) 100 mg tablet Take 1 Tablet by mouth daily. 90 Tablet 0    nitroglycerin (NITRODUR) 0.4 mg/hr 1 Patch by TransDERmal route daily. 90 Patch 3    aspirin delayed-release 81 mg tablet Take 81 mg by mouth daily.  ondansetron (Zofran ODT) 4 mg disintegrating tablet 1 Tab by SubLINGual route every eight (8) hours as needed for Nausea or Vomiting. 20 Tab 0    amLODIPine (NORVASC) 10 mg tablet Take 10 mg by mouth daily.  fosinopriL (MONOPRIL) 10 mg tablet Take 1 Tab by mouth daily.  90 Tab 1    isosorbide mononitrate ER (IMDUR) 60 mg CR tablet 60 mg = 1 tab each dose, PO, daily, # 90 tab, 3 Refills, Pharmacy: [address] [id number] 30 Tab 0    omeprazole (PRILOSEC) 20 mg capsule 20 mg = 1 CAP each dose, PO, daily, # 90 CAP, 3 Refills, Pharmacy: [address] [id number] 60 Cap 0    rosuvastatin (CRESTOR) 40 mg tablet TAKE 1 TABLET BY MOUTH ONCE DAILY 60 Tab 0       Allergies  Allergies   Allergen Reactions    Jardiance [Empagliflozin] Nausea Only     HYPOTENSION    Metformin Diarrhea       Social History   Social History     Socioeconomic History    Marital status:      Spouse name: Not on file    Number of children: Not on file    Years of education: Not on file    Highest education level: Not on file   Occupational History    Not on file   Tobacco Use    Smoking status: Never Smoker    Smokeless tobacco: Never Used   Substance and Sexual Activity    Alcohol use: Not Currently    Drug use: Never    Sexual activity: Never   Other Topics Concern    Not on file   Social History Narrative    Not on file     Social Determinants of Health     Financial Resource Strain:     Difficulty of Paying Living Expenses: Not on file   Food Insecurity:     Worried About Running Out of Food in the Last Year: Not on file    [address] in the Last Year: Not on file   Transportation Needs:     Lack of Transportation (Medical): Not on file    Lack of Transportation (Non-Medical): Not on file   Physical Activity:     Days of Exercise per Week: Not on file    Minutes of Exercise per Session: Not on file   Stress:     Feeling of Stress : Not on file   Social Connections:     Frequency of Communication with Friends and Family: Not on file    Frequency of Social Gatherings with Friends and Family: Not on file    Attends Bahai Services: Not on file    Active Member of [address] [place] [address] [employer] or Organizations: Not on file    Attends Club or Organization Meetings: Not on file    Marital Status: Not on file   Intimate Partner Violence:     Fear of Current or Ex-Partner: Not on file    Emotionally Abused: Not on file    Physically Abused: Not on file    Sexually Abused: Not on file   Housing Stability:     Unable to Pay for Housing in the Last Year: Not on file    Number of [place] in the Last Year: Not on file    Unstable Housing in the Last Year: Not on file       Family History  Family History   Problem Relation Age of Onset    Heart Attack Mother        Objective   Vital Signs  Visit Vitals  BP (!) 145/66 (BP 1 Location: Right arm, BP Patient Position: Sitting, BP Cuff Size: Adult)   Pulse 60   Temp 97.9 °F (36.6 °C) (Oral)   Resp 16   Ht 5' 4\" (1.626 m)   Wt 154 lb (69.9 kg)   SpO2 96%   BMI 26.43 kg/m²       Physical Examination  Physical Exam  Constitutional:       Appearance: Normal appearance. HENT:      Head: Normocephalic and atraumatic. Eyes:      Conjunctiva/sclera: Conjunctivae normal.   Cardiovascular:      Rate and Rhythm: Normal rate and regular rhythm. Pulmonary:      Effort: Pulmonary effort is normal.      Breath sounds: Normal breath sounds. Abdominal:      General: Abdomen is flat. Palpations: Abdomen is soft. Musculoskeletal:      Cervical back: Normal range of motion and neck supple.       Comments: Right shoulder painful with all rotational movement and adduction of shoulder and flexion at elbow, mild effusion of right shoulder joint   Skin:     General: Skin is warm. Neurological:      General: No focal deficit present. Mental Status: She is alert and oriented to person, place, and time. Assessment and Plan   Danielle Calderon is a 68 y.o. who is here for right shoulder pain. 1. Right shoulder pain, unspecified chronicity  Dx'ed with OA in past by her PCP at [facility]. No imaging done here, no corticosteroid injection in years, per pt. In past, joint injection has helped with her pain. - Continue Biofreeze and heat as conservative approaches to pain  - Scheduled with Sports Medicine clinic, advised pt to have XR done prior to appt. - XR SHOULDER RT AP/LAT MIN 2 V; Future    2. Primary osteoarthritis of right shoulder  As above  - XR SHOULDER RT AP/LAT MIN 2 V; Future    3. Coronary artery disease involving coronary bypass graft of native heart without angina pectoris  Her previous symptoms did not appear cardiac in nature, according to her, chest pain w/u all wnl at the time. Had scheduled f/u after hospital presentation with Dr. Andrews Chaney, cardiology, who advised to continue current regimen and offered stress test, which she deferred at this time. Advise she continue routine f/u with cardiology. - CBC W/O DIFF; Future  - METABOLIC PANEL, BASIC; Future  - LIPID PANEL; Future    4. Type 2 diabetes mellitus with other specified complication, without long-term current use of insulin (HCC)  Prior A1c 3 months ago was 7.6%. Will recheck today and determine if any med changes are indicated. - HEMOGLOBIN A1C WITH EAG; Future    5. Dyslipidemia  Continue on Rosuvastatin. - CBC W/O DIFF; Future  - METABOLIC PANEL, BASIC; Future  - LIPID PANEL; Future    6. Primary hypertension  - CBC W/O DIFF; Future  - METABOLIC PANEL, BASIC; Future       RTC 1 week for sports medicine appt.     Patient is counseled to return to the office if symptoms do not improve as expected. Urgent consultation with the nearest Emergency Department is strongly recommended if condition worsens. Patient is counseled to follow up as recommended and to inform the office if any changes in treatment are recommended.       I discussed this patient with Dr. Marisol Vo (Attending Physician)       Signed By:  Janet Hurtado MD  Family Medicine Resident

## 2021-12-17 ENCOUNTER — OFFICE VISIT (OUTPATIENT)
Dept: FAMILY MEDICINE CLINIC | Age: 81
End: 2021-12-17

## 2021-12-17 ENCOUNTER — OFFICE VISIT (OUTPATIENT)
Dept: CARDIOLOGY CLINIC | Age: 81
End: 2021-12-17
Payer: MEDICARE

## 2021-12-17 VITALS
HEART RATE: 60 BPM | DIASTOLIC BLOOD PRESSURE: 60 MMHG | HEIGHT: 64 IN | WEIGHT: 150 LBS | BODY MASS INDEX: 25.61 KG/M2 | SYSTOLIC BLOOD PRESSURE: 120 MMHG | OXYGEN SATURATION: 96 %

## 2021-12-17 VITALS
HEIGHT: 64 IN | OXYGEN SATURATION: 96 % | DIASTOLIC BLOOD PRESSURE: 66 MMHG | HEART RATE: 60 BPM | TEMPERATURE: 97.9 F | WEIGHT: 154 LBS | SYSTOLIC BLOOD PRESSURE: 145 MMHG | RESPIRATION RATE: 16 BRPM | BODY MASS INDEX: 26.29 KG/M2

## 2021-12-17 DIAGNOSIS — I25.810 CORONARY ARTERY DISEASE INVOLVING CORONARY BYPASS GRAFT OF NATIVE HEART WITHOUT ANGINA PECTORIS: ICD-10-CM

## 2021-12-17 DIAGNOSIS — I25.810 CORONARY ARTERY DISEASE INVOLVING CORONARY BYPASS GRAFT OF NATIVE HEART WITHOUT ANGINA PECTORIS: Primary | ICD-10-CM

## 2021-12-17 DIAGNOSIS — M25.511 RIGHT SHOULDER PAIN, UNSPECIFIED CHRONICITY: Primary | ICD-10-CM

## 2021-12-17 DIAGNOSIS — E78.5 DYSLIPIDEMIA: ICD-10-CM

## 2021-12-17 DIAGNOSIS — E11.69 TYPE 2 DIABETES MELLITUS WITH OTHER SPECIFIED COMPLICATION, WITHOUT LONG-TERM CURRENT USE OF INSULIN (HCC): ICD-10-CM

## 2021-12-17 DIAGNOSIS — M19.011 PRIMARY OSTEOARTHRITIS OF RIGHT SHOULDER: ICD-10-CM

## 2021-12-17 DIAGNOSIS — I10 PRIMARY HYPERTENSION: ICD-10-CM

## 2021-12-17 PROCEDURE — 1101F PT FALLS ASSESS-DOCD LE1/YR: CPT | Performed by: SPECIALIST

## 2021-12-17 PROCEDURE — G8400 PT W/DXA NO RESULTS DOC: HCPCS | Performed by: STUDENT IN AN ORGANIZED HEALTH CARE EDUCATION/TRAINING PROGRAM

## 2021-12-17 PROCEDURE — G8536 NO DOC ELDER MAL SCRN: HCPCS | Performed by: STUDENT IN AN ORGANIZED HEALTH CARE EDUCATION/TRAINING PROGRAM

## 2021-12-17 PROCEDURE — G8754 DIAS BP LESS 90: HCPCS | Performed by: SPECIALIST

## 2021-12-17 PROCEDURE — G8419 CALC BMI OUT NRM PARAM NOF/U: HCPCS | Performed by: STUDENT IN AN ORGANIZED HEALTH CARE EDUCATION/TRAINING PROGRAM

## 2021-12-17 PROCEDURE — 99214 OFFICE O/P EST MOD 30 MIN: CPT | Performed by: STUDENT IN AN ORGANIZED HEALTH CARE EDUCATION/TRAINING PROGRAM

## 2021-12-17 PROCEDURE — G8754 DIAS BP LESS 90: HCPCS | Performed by: STUDENT IN AN ORGANIZED HEALTH CARE EDUCATION/TRAINING PROGRAM

## 2021-12-17 PROCEDURE — 99214 OFFICE O/P EST MOD 30 MIN: CPT | Performed by: SPECIALIST

## 2021-12-17 PROCEDURE — 1090F PRES/ABSN URINE INCON ASSESS: CPT | Performed by: SPECIALIST

## 2021-12-17 PROCEDURE — G8510 SCR DEP NEG, NO PLAN REQD: HCPCS | Performed by: STUDENT IN AN ORGANIZED HEALTH CARE EDUCATION/TRAINING PROGRAM

## 2021-12-17 PROCEDURE — G8419 CALC BMI OUT NRM PARAM NOF/U: HCPCS | Performed by: SPECIALIST

## 2021-12-17 PROCEDURE — 1101F PT FALLS ASSESS-DOCD LE1/YR: CPT | Performed by: STUDENT IN AN ORGANIZED HEALTH CARE EDUCATION/TRAINING PROGRAM

## 2021-12-17 PROCEDURE — G8536 NO DOC ELDER MAL SCRN: HCPCS | Performed by: SPECIALIST

## 2021-12-17 PROCEDURE — G8400 PT W/DXA NO RESULTS DOC: HCPCS | Performed by: SPECIALIST

## 2021-12-17 PROCEDURE — 3052F HG A1C>EQUAL 8.0%<EQUAL 9.0%: CPT | Performed by: STUDENT IN AN ORGANIZED HEALTH CARE EDUCATION/TRAINING PROGRAM

## 2021-12-17 PROCEDURE — G8427 DOCREV CUR MEDS BY ELIG CLIN: HCPCS | Performed by: STUDENT IN AN ORGANIZED HEALTH CARE EDUCATION/TRAINING PROGRAM

## 2021-12-17 PROCEDURE — 1090F PRES/ABSN URINE INCON ASSESS: CPT | Performed by: STUDENT IN AN ORGANIZED HEALTH CARE EDUCATION/TRAINING PROGRAM

## 2021-12-17 PROCEDURE — G8752 SYS BP LESS 140: HCPCS | Performed by: STUDENT IN AN ORGANIZED HEALTH CARE EDUCATION/TRAINING PROGRAM

## 2021-12-17 PROCEDURE — G8427 DOCREV CUR MEDS BY ELIG CLIN: HCPCS | Performed by: SPECIALIST

## 2021-12-17 PROCEDURE — G8752 SYS BP LESS 140: HCPCS | Performed by: SPECIALIST

## 2021-12-17 PROCEDURE — G8510 SCR DEP NEG, NO PLAN REQD: HCPCS | Performed by: SPECIALIST

## 2021-12-17 NOTE — PROGRESS NOTES
Chief Complaint   Patient presents with   [facility] Follow Up     SFED 11/20 UTI, CP    Chest Pain (Angina)    Hypertension     Visit Vitals  /60 (BP 1 Location: Right upper arm, BP Patient Position: Sitting, BP Cuff Size: Adult)   Pulse 60   Ht 5' 4\" (1.626 m)   Wt 150 lb (68 kg)   SpO2 96%   BMI 25.75 kg/m²     Chest pain denied   SOB POTTS slight  Palpitations denied   Swelling in hands/feet denied   Dizziness denied   Recent hospital stays see above  Refills denied

## 2021-12-17 NOTE — PROGRESS NOTES
Identified Patient with two Patient identifiers (Name and ). Two Patient Identifiers confirmed. Reviewed record in preparation for visit and have obtained necessary documentation. Chief Complaint   Patient presents with   [facility]     Patient recently went to the ER recently for body pains. Follow up. Visit Vitals  BP (!) 145/66 (BP 1 Location: Right arm, BP Patient Position: Sitting, BP Cuff Size: Adult)   Pulse 60   Temp 97.9 °F (36.6 °C) (Oral)   Resp 16   Ht 5' 4\" (1.626 m)   Wt 154 lb (69.9 kg)   SpO2 96%   BMI 26.43 kg/m²       1. Have you been to the ER, urgent care clinic since your last visit? Hospitalized since your last visit? No    2. Have you seen or consulted any other health care providers outside of the [address] [place] [address] since your last visit? Include any pap smears or colon screening.  No

## 2021-12-17 NOTE — PATIENT INSTRUCTIONS
Shoulder Arthritis: Exercises  Introduction  Here are some examples of exercises for you to try. The exercises may be suggested for a condition or for rehabilitation. Start each exercise slowly. Ease off the exercises if you start to have pain. You will be told when to start these exercises and which ones will work best for you. How to do the exercises  Shoulder flexion (lying down)    To make a wand for this exercise, use a piece of PVC pipe or a broom handle with the broom removed. Make the wand about a foot wider than your shoulders. 1. Lie on your back, holding a wand with both hands. Your palms should face down as you hold the wand. 2. Keeping your elbows straight, slowly raise your arms over your head. Raise them until you feel a stretch in your shoulders, upper back, and chest.  3. Hold for 15 to 30 seconds. 4. Repeat 2 to 4 times. Shoulder rotation (lying down)    To make a wand for this exercise, use a piece of PVC pipe or a broom handle with the broom removed. Make the wand about a foot wider than your shoulders. 1. Lie on your back. Hold a wand with both hands with your elbows bent and palms up. 2. Keep your elbows close to your body, and move the wand across your body toward the sore arm. 3. Hold for 8 to 12 seconds. 4. Repeat 2 to 4 times. Shoulder internal rotation with towel    1. Hold a towel above and behind your head with the arm that is not sore. 2. With your sore arm, reach behind your back and grasp the towel. 3. With the arm above your head, pull the towel upward. Do this until you feel a stretch on the front and outside of your sore shoulder. 4. Hold 15 to 30 seconds. 5. Repeat 2 to 4 times. Shoulder blade squeeze    1. Stand with your arms at your sides, and squeeze your shoulder blades together. Do not raise your shoulders up as you squeeze. 2. Hold 6 seconds. 3. Repeat 8 to 12 times.   Resisted rows    For this exercise, you will need elastic exercise material, such as surgical tubing or Thera-Band. 1. Put the band around a solid object at about waist level. (A bedpost will work well.) Each hand should hold an end of the band. 2. With your elbows at your sides and bent to 90 degrees, pull the band back. Your shoulder blades should move toward each other. Return to the starting position. 3. Repeat 8 to 12 times. External rotator strengthening exercise    1. Start by tying a piece of elastic exercise material to a doorknob. You can use surgical tubing or Thera-Band. (You may also hold one end of the band in each hand.)  2. Stand or sit with your shoulder relaxed and your elbow bent 90 degrees. Your upper arm should rest comfortably against your side. Squeeze a rolled towel between your elbow and your body for comfort. This will help keep your arm at your side. 3. Hold one end of the elastic band with the hand of the painful arm. 4. Start with your forearm across your belly. Slowly rotate the forearm out away from your body. Keep your elbow and upper arm tucked against the towel roll or the side of your body until you begin to feel tightness in your shoulder. Slowly move your arm back to where you started. 5. Repeat 8 to 12 times. Internal rotator strengthening exercise    1. Start by tying a piece of elastic exercise material to a doorknob. You can use surgical tubing or Thera-Band. 2. Stand or sit with your shoulder relaxed and your elbow bent 90 degrees. Your upper arm should rest comfortably against your side. Squeeze a rolled towel between your elbow and your body for comfort. This will help keep your arm at your side. 3. Hold one end of the elastic band in the hand of the painful arm. 4. Slowly rotate your forearm toward your body until it touches your belly. Slowly move it back to where you started. 5. Keep your elbow and upper arm firmly tucked against the towel roll or at your side. 6. Repeat 8 to 12 times.   Pendulum swing    If you have pain in your back, do not do this exercise. 1. Hold on to a table or the back of a chair with your good arm. Then bend forward a little and let your sore arm hang straight down. This exercise does not use the arm muscles. Rather, use your legs and your hips to create movement that makes your arm swing freely. 2. Use the movement from your hips and legs to guide the slightly swinging arm back and forth like a pendulum (or elephant trunk). Then guide it in circles that start small (about the size of a dinner plate). Make the circles a bit larger each day, as your pain allows. 3. Do this exercise for 5 minutes, 5 to 7 times each day. 4. As you have less pain, try bending over a little farther to do this exercise. This will increase the amount of movement at your shoulder. Follow-up care is a key part of your treatment and safety. Be sure to make and go to all appointments, and call your doctor if you are having problems. It's also a good idea to know your test results and keep a list of the medicines you take. Where can you learn more? Go to http://www.gray.com/  Enter H562 in the search box to learn more about \"Shoulder Arthritis: Exercises. \"  Current as of: July 1, 2021               Content Version: 13.0  © 2006-2021 [employer]. Care instructions adapted under license by [employer] (which disclaims liability or warranty for this information). If you have questions about a medical condition or this instruction, always ask your healthcare professional. [place] [address] any warranty or liability for your use of this information. Musculoskeletal Pain: Care Instructions  Your Care Instructions     Different problems with the bones, muscles, nerves, ligaments, and tendons in the body can cause pain. One or more areas of your body may ache or burn. Or they may feel tired, stiff, or sore. The medical term for this type of pain is musculoskeletal pain.  It can have many different causes. Sometimes the pain is caused by an injury such as a strain or sprain. Or you might have pain from using one part of your body in the same way over and over again. This is called overuse. In some cases, the cause of the pain is another health problem such as arthritis or fibromyalgia. The doctor will examine you and ask you questions about your health to help find the cause of your pain. Blood tests or imaging tests like an X-ray may also be helpful. But sometimes doctors can't find a cause of the pain. Treatment depends on your symptoms and the cause of the pain, if known. The doctor has checked you carefully, but problems can develop later. If you notice any problems or new symptoms, get medical treatment right away. Follow-up care is a key part of your treatment and safety. Be sure to make and go to all appointments, and call your doctor if you are having problems. It's also a good idea to know your test results and keep a list of the medicines you take. How can you care for yourself at home? · If you have new pain:  ? Rest until you feel better. ? Do not do anything that makes the pain worse. Return to exercise gradually if you feel better and your doctor says it's okay. · Be safe with medicines. Read and follow all instructions on the label. ? If the doctor gave you a prescription medicine for pain, take it as prescribed. ? If you are not taking a prescription pain medicine, ask your doctor if you can take an over-the-counter medicine. · Put heat or cold where your pain is, as needed. Use what helps you most. You can also switch between hot and cold packs. ? Apply heat 2 or 3 times a day for 20 to 30 minutes. This can be done using a heating pad, hot shower, or hot pack to relieve pain and stiffness. But don't use heat on a newly swollen joint. ? Put ice or a cold pack on the painful spot for 10 to 20 minutes at a time.  Put a thin cloth between the ice and your skin.  When should you call for help? Call your doctor now or seek immediate medical care if:    · You have new pain, or your pain gets worse.     · You have new symptoms such as a fever, a rash, or chills. Watch closely for changes in your health, and be sure to contact your doctor if:    · You do not get better as expected. Where can you learn more? Go to http://www.gray.com/  Enter Q624 in the search box to learn more about \"Musculoskeletal Pain: Care Instructions. \"  Current as of: April 8, 2021               Content Version: 13.0  © 4640-4460 [employer]. Care instructions adapted under license by [employer] (which disclaims liability or warranty for this information). If you have questions about a medical condition or this instruction, always ask your healthcare professional. [address] any warranty or liability for your use of this information.

## 2021-12-18 LAB
ANION GAP SERPL CALC-SCNC: 5 MMOL/L (ref 5–15)
BUN SERPL-MCNC: 16 MG/DL (ref 6–20)
BUN/CREAT SERPL: 17 (ref 12–20)
CALCIUM SERPL-MCNC: 9.3 MG/DL (ref 8.5–10.1)
CHLORIDE SERPL-SCNC: 106 MMOL/L (ref 97–108)
CHOLEST SERPL-MCNC: 135 MG/DL
CO2 SERPL-SCNC: 24 MMOL/L (ref 21–32)
CREAT SERPL-MCNC: 0.94 MG/DL (ref 0.55–1.02)
ERYTHROCYTE [DISTWIDTH] IN BLOOD BY AUTOMATED COUNT: 13.7 % (ref 11.5–14.5)
EST. AVERAGE GLUCOSE BLD GHB EST-MCNC: 212 MG/DL
GLUCOSE SERPL-MCNC: 240 MG/DL (ref 65–100)
HBA1C MFR BLD: 9 % (ref 4–5.6)
HCT VFR BLD AUTO: 36.3 % (ref 35–47)
HDLC SERPL-MCNC: 57 MG/DL
HDLC SERPL: 2.4 {RATIO} (ref 0–5)
HGB BLD-MCNC: 11.5 G/DL (ref 11.5–16)
LDLC SERPL CALC-MCNC: 64.2 MG/DL (ref 0–100)
MCH RBC QN AUTO: 26.9 PG (ref 26–34)
MCHC RBC AUTO-ENTMCNC: 31.7 G/DL (ref 30–36.5)
MCV RBC AUTO: 85 FL (ref 80–99)
NRBC # BLD: 0 K/UL (ref 0–0.01)
NRBC BLD-RTO: 0 PER 100 WBC
PLATELET # BLD AUTO: 211 K/UL (ref 150–400)
PMV BLD AUTO: 10.2 FL (ref 8.9–12.9)
POTASSIUM SERPL-SCNC: 4 MMOL/L (ref 3.5–5.1)
RBC # BLD AUTO: 4.27 M/UL (ref 3.8–5.2)
SODIUM SERPL-SCNC: 135 MMOL/L (ref 136–145)
TRIGL SERPL-MCNC: 69 MG/DL (ref ?–150)
VLDLC SERPL CALC-MCNC: 13.8 MG/DL
WBC # BLD AUTO: 9.2 K/UL (ref 3.6–11)

## 2021-12-22 ENCOUNTER — TELEPHONE (OUTPATIENT)
Dept: FAMILY MEDICINE CLINIC | Age: 81
End: 2021-12-22

## 2021-12-22 NOTE — PROGRESS NOTES
Labs reviewed - A1c increased to 9.0 as c/w 7.6 three months ago. CBC wnl. GFR 58. Lipid panel wnl. Sent message to have pt scheduled to be seen to discuss options for medication changes.

## 2021-12-22 NOTE — TELEPHONE ENCOUNTER
Called pt in regards to making a f/u lab appt per Dr. Donn Carreon request. Pt was not understanding that the appointment for 1/5 is a separate appointment. and told me she was out of town . Told her to call back when she is in town so we can schedule this appointment.

## 2022-01-07 RX ORDER — SITAGLIPTIN 100 MG/1
TABLET, FILM COATED ORAL
Qty: 30 TABLET | Refills: 0 | Status: SHIPPED | OUTPATIENT
Start: 2022-01-07 | End: 2022-02-18

## 2022-01-08 RX ORDER — FOSINOPIRL SODIUM 10 MG/1
TABLET ORAL
Qty: 90 TABLET | Refills: 0 | Status: SHIPPED | OUTPATIENT
Start: 2022-01-08 | End: 2022-04-22

## 2022-01-08 RX ORDER — OMEPRAZOLE 20 MG/1
CAPSULE, DELAYED RELEASE ORAL
Qty: 90 CAPSULE | Refills: 0 | Status: SHIPPED | OUTPATIENT
Start: 2022-01-08 | End: 2022-05-02 | Stop reason: ALTCHOICE

## 2022-01-08 NOTE — TELEPHONE ENCOUNTER
Please call patient to schedule an appt regarding her Omeprazole. Will need to review GI history and records to see if this medication is still safe and appropriate.    Thank you

## 2022-01-12 ENCOUNTER — OFFICE VISIT (OUTPATIENT)
Dept: FAMILY MEDICINE CLINIC | Age: 82
End: 2022-01-12
Payer: MEDICARE

## 2022-01-12 VITALS
BODY MASS INDEX: 26.26 KG/M2 | SYSTOLIC BLOOD PRESSURE: 137 MMHG | TEMPERATURE: 98.2 F | WEIGHT: 153.8 LBS | RESPIRATION RATE: 16 BRPM | HEART RATE: 66 BPM | OXYGEN SATURATION: 97 % | HEIGHT: 64 IN | DIASTOLIC BLOOD PRESSURE: 85 MMHG

## 2022-01-12 DIAGNOSIS — E11.69 TYPE 2 DIABETES MELLITUS WITH OTHER SPECIFIED COMPLICATION, UNSPECIFIED WHETHER LONG TERM INSULIN USE (HCC): Primary | ICD-10-CM

## 2022-01-12 PROCEDURE — G8752 SYS BP LESS 140: HCPCS | Performed by: STUDENT IN AN ORGANIZED HEALTH CARE EDUCATION/TRAINING PROGRAM

## 2022-01-12 PROCEDURE — G8427 DOCREV CUR MEDS BY ELIG CLIN: HCPCS | Performed by: STUDENT IN AN ORGANIZED HEALTH CARE EDUCATION/TRAINING PROGRAM

## 2022-01-12 PROCEDURE — 99214 OFFICE O/P EST MOD 30 MIN: CPT | Performed by: STUDENT IN AN ORGANIZED HEALTH CARE EDUCATION/TRAINING PROGRAM

## 2022-01-12 PROCEDURE — G8432 DEP SCR NOT DOC, RNG: HCPCS | Performed by: STUDENT IN AN ORGANIZED HEALTH CARE EDUCATION/TRAINING PROGRAM

## 2022-01-12 PROCEDURE — G8419 CALC BMI OUT NRM PARAM NOF/U: HCPCS | Performed by: STUDENT IN AN ORGANIZED HEALTH CARE EDUCATION/TRAINING PROGRAM

## 2022-01-12 PROCEDURE — G8536 NO DOC ELDER MAL SCRN: HCPCS | Performed by: STUDENT IN AN ORGANIZED HEALTH CARE EDUCATION/TRAINING PROGRAM

## 2022-01-12 PROCEDURE — G8400 PT W/DXA NO RESULTS DOC: HCPCS | Performed by: STUDENT IN AN ORGANIZED HEALTH CARE EDUCATION/TRAINING PROGRAM

## 2022-01-12 PROCEDURE — G8754 DIAS BP LESS 90: HCPCS | Performed by: STUDENT IN AN ORGANIZED HEALTH CARE EDUCATION/TRAINING PROGRAM

## 2022-01-12 PROCEDURE — 1101F PT FALLS ASSESS-DOCD LE1/YR: CPT | Performed by: STUDENT IN AN ORGANIZED HEALTH CARE EDUCATION/TRAINING PROGRAM

## 2022-01-12 PROCEDURE — 1090F PRES/ABSN URINE INCON ASSESS: CPT | Performed by: STUDENT IN AN ORGANIZED HEALTH CARE EDUCATION/TRAINING PROGRAM

## 2022-01-12 RX ORDER — GLIPIZIDE 5 MG/1
5 TABLET ORAL 2 TIMES DAILY
Qty: 90 TABLET | Refills: 1 | Status: SHIPPED | OUTPATIENT
Start: 2022-01-12 | End: 2022-04-22

## 2022-01-12 NOTE — PROGRESS NOTES
Anthony Hamm is a 80 y.o. female    Chief Complaint   Patient presents with    Follow-up     Patient states that her shoulder pain is much better. She is unaware about why she is here. No other concerns. 1. Have you been to the ER, urgent care clinic since your last visit? Hospitalized since your last visit? No  2. Have you seen or consulted any other health care providers outside of the [address] [place] [address] since your last visit? Include any pap smears or colon screening. No      Visit Vitals  /85 (BP 1 Location: Left upper arm, BP Patient Position: Sitting)   Pulse 66   Temp 98.2 °F (36.8 °C) (Oral)   Resp 16   Ht 5' 4\" (1.626 m)   Wt 153 lb 12.8 oz (69.8 kg)   SpO2 97%   BMI 26.40 kg/m²           Health Maintenance Due   Topic Date Due    Hepatitis C Screening  Never done    Foot Exam Q1  Never done    DTaP/Tdap/Td series (1 - Tdap) Never done    Shingrix Vaccine Age 50> (1 of 2) Never done    Bone Densitometry (Dexa) Screening  Never done    Pneumococcal 65+ years (1 of 1 - PPSV23) Never done    Medicare Yearly Exam  Never done    Flu Vaccine (1) Never done         Medication Reconciliation completed, changes noted.   Please  Update medication list.

## 2022-01-12 NOTE — PROGRESS NOTES
Jovita Marquez is a 80 y.o. female who presents for Follow-up (Patient states that her shoulder pain is much better. She is unaware about why she is here. No other concerns. )    Diabetes  A1c of 9.0 last visit in December. Previously did not tolerate Metformin or Jardiance. - Medications:  Currently on Januvia. - Taking meds as directed:  yes  - Side effects from medications:  no  - Low sugar/carbs in diet? Admits to eating more sweets lately, losing track of healthy diet. - Home blood sugar readings:  80 last night, 300s once a week, mostly 180s lately. - Patient denies new or worsening polyuria, polydipsia. She denies hypoglycemic episodes. R Shoulder Pain  Was present for about 3-4 weeks. Says she used biofreeze and rested, and it went away. She occasionally has mild pain at night. Takes ibuprofen. She had previously been diagnosed with OA and received cortisone shots in that shoulder. It is no longer painful for her. No difficulties with normal activity. Review of Systems   Review of Systems   Constitutional: Negative for chills and fever. HENT: Negative for congestion and sore throat. Eyes: Negative for discharge and redness. Respiratory: Negative for cough and shortness of breath. Cardiovascular: Negative for chest pain and palpitations. Gastrointestinal: Negative for nausea and vomiting. Genitourinary: Negative for difficulty urinating and dysuria. Musculoskeletal: Negative for gait problem and neck stiffness. Neurological: Negative for weakness and numbness. Medical History  Past Medical History:   Diagnosis Date    CAD (coronary artery disease)     Diabetes ([facility] [address]     Dyslipidemia     Endocrine disease     Hx of CABG     Hypertension     S/P triple vessel bypass 2005       Medications  Current Outpatient Medications   Medication Sig    glipiZIDE (GLUCOTROL) 5 mg tablet Take 1 Tablet by mouth two (2) times a day.     omeprazole (PRILOSEC) 20 mg capsule Take 1 capsule by mouth once daily    Januvia 100 mg tablet Take 1 tablet by mouth once daily    nitroglycerin (NITRODUR) 0.4 mg/hr 1 Patch by TransDERmal route daily.  aspirin delayed-release 81 mg tablet Take 81 mg by mouth daily.  amLODIPine (NORVASC) 10 mg tablet Take 10 mg by mouth daily.  isosorbide mononitrate ER (IMDUR) 60 mg CR tablet 60 mg = 1 tab each dose, PO, daily, # 90 tab, 3 Refills, Pharmacy: [facility] [address]    rosuvastatin (CRESTOR) 40 mg tablet TAKE 1 TABLET BY MOUTH ONCE DAILY    fosinopriL (MONOPRIL) 10 mg tablet Take 1 tablet by mouth once daily     No current facility-administered medications for this visit. Immunizations   Immunization History   Administered Date(s) Administered    JOSIEID-19, Donn Oquendo, Primary or Immunocompromised Series, MRNA, PF, 100mcg/0.5mL 01/10/2021, 02/06/2021, 12/17/2021       Allergies   Allergies   Allergen Reactions    Jardiance [Empagliflozin] Nausea Only     HYPOTENSION    Metformin Diarrhea       Objective   Vital Signs  Visit Vitals  /85 (BP 1 Location: Left upper arm, BP Patient Position: Sitting)   Pulse 66   Temp 98.2 °F (36.8 °C) (Oral)   Resp 16   Ht 5' 4\" (1.626 m)   Wt 153 lb 12.8 oz (69.8 kg)   SpO2 97%   BMI 26.40 kg/m²       Physical Examination  Physical Exam  Constitutional:       General: She is not in acute distress. Appearance: Normal appearance. HENT:      Head: Normocephalic and atraumatic. Right Ear: External ear normal.      Left Ear: External ear normal.      Nose: Nose normal.   Eyes:      General:         Right eye: No discharge. Left eye: No discharge. Extraocular Movements: Extraocular movements intact. Pupils: Pupils are equal, round, and reactive to light. Cardiovascular:      Rate and Rhythm: Normal rate and regular rhythm. Pulses: Normal pulses. Heart sounds: Normal heart sounds.    Pulmonary:      Effort: Pulmonary effort is normal. No respiratory distress. Breath sounds: Normal breath sounds. Abdominal:      General: Abdomen is flat. Palpations: Abdomen is soft. Tenderness: There is no abdominal tenderness. Musculoskeletal:      Right lower leg: No edema. Left lower leg: No edema. Skin:     General: Skin is warm and dry. Neurological:      General: No focal deficit present. Mental Status: She is alert and oriented to person, place, and time. Psychiatric:         Mood and Affect: Mood normal.         Behavior: Behavior normal.          Assessment and Plan   Heena Gutierrez is a 80 y.o. female who presents for Follow-up (Patient states that her shoulder pain is much better. She is unaware about why she is here. No other concerns. )      1. Type 2 diabetes mellitus with other specified complication, unspecified whether long term insulin use ([facility])  Discussed that Metformin would be best oral medication for patient to be on and that the lower dose extended release formulation is best tolerated, but she is unwilling to try this medication again after not tolerating it in the past. She says she did well on Glipizide in the past and had a stable A1c for about 3 years on Glipizide and Januvia together. We will try this agent and recheck A1c in 2 months.  - Hesitant to start insulin on insulin naive patient at age of 80, but will try if A1c does not improve with new oral agent  - Continue taking blood sugar daily  - Discussed healthy diet, handout given  - glipiZIDE (GLUCOTROL) 5 mg tablet; Take 1 Tablet by mouth two (2) times a day. Dispense: 90 Tablet; Refill: 1        ICD-10-CM ICD-9-CM    1. Type 2 diabetes mellitus with other specified complication, unspecified whether long term insulin use (HCC)  E11.69 250.80 glipiZIDE (GLUCOTROL) 5 mg tablet       Follow-up and Dispositions    · Return in about 8 weeks (around 3/9/2022) for Diabetes follow up. Pt was discussed with Dr. Irma Harris (attending physician).     I have reviewed patient medical and social history and medications. I have reviewed pertinent labs results and other data. I have discussed the diagnosis with the patient and the intended plan as seen in the above orders. The patient has received an after-visit summary and questions were answered concerning future plans. I have discussed medication side effects and warnings with the patient as well.     Ally Cruz MD  Resident, [address] [place] [address]  01/12/22

## 2022-01-12 NOTE — PATIENT INSTRUCTIONS

## 2022-01-18 ENCOUNTER — TELEPHONE (OUTPATIENT)
Dept: FAMILY MEDICINE CLINIC | Age: 82
End: 2022-01-18

## 2022-01-18 NOTE — TELEPHONE ENCOUNTER
Left message for patient, as instructed. Clarified that she had been seen by multiple physicians based on availability when she made her appointment. Also clarified that her most recent visit was regarding her diabetes and the comment about her being \"unaware of why she was there\" was from the nursing note when patient was prompted about why she was following up. She was unsure about follow up, but we determined it was regarding her diabetes.

## 2022-01-18 NOTE — TELEPHONE ENCOUNTER
----- Message from Jose Carlos Rod sent at 1/13/2022  5:18 PM EST -----  Subject: Message to Provider    QUESTIONS  Information for Provider? Pt is concerned about being booked between two   different doctors and when she read her discharge notes said \"pt was   unaware of why she was there\" pt is concern that she being made to be   incompetent and she's not   ---------------------------------------------------------------------------  --------------  CALL BACK INFO  What is the best way for the office to contact you? OK to leave message on   voicemail  Preferred Call Back Phone Number? [phone]  ---------------------------------------------------------------------------  --------------  SCRIPT ANSWERS  Relationship to Patient?  Self

## 2022-03-10 ENCOUNTER — OFFICE VISIT (OUTPATIENT)
Dept: FAMILY MEDICINE CLINIC | Age: 82
End: 2022-03-10
Payer: MEDICARE

## 2022-03-10 VITALS
SYSTOLIC BLOOD PRESSURE: 118 MMHG | HEART RATE: 62 BPM | OXYGEN SATURATION: 100 % | DIASTOLIC BLOOD PRESSURE: 66 MMHG | WEIGHT: 154 LBS | TEMPERATURE: 97.7 F | RESPIRATION RATE: 18 BRPM | BODY MASS INDEX: 26.29 KG/M2 | HEIGHT: 64 IN

## 2022-03-10 DIAGNOSIS — E11.69 TYPE 2 DIABETES MELLITUS WITH OTHER SPECIFIED COMPLICATION, WITHOUT LONG-TERM CURRENT USE OF INSULIN (HCC): Primary | ICD-10-CM

## 2022-03-10 LAB — GLUCOSE DOSE-GTT, POCT, GLDSPOCT: 209

## 2022-03-10 PROCEDURE — G8536 NO DOC ELDER MAL SCRN: HCPCS | Performed by: STUDENT IN AN ORGANIZED HEALTH CARE EDUCATION/TRAINING PROGRAM

## 2022-03-10 PROCEDURE — G8427 DOCREV CUR MEDS BY ELIG CLIN: HCPCS | Performed by: STUDENT IN AN ORGANIZED HEALTH CARE EDUCATION/TRAINING PROGRAM

## 2022-03-10 PROCEDURE — G8419 CALC BMI OUT NRM PARAM NOF/U: HCPCS | Performed by: STUDENT IN AN ORGANIZED HEALTH CARE EDUCATION/TRAINING PROGRAM

## 2022-03-10 PROCEDURE — G8432 DEP SCR NOT DOC, RNG: HCPCS | Performed by: STUDENT IN AN ORGANIZED HEALTH CARE EDUCATION/TRAINING PROGRAM

## 2022-03-10 PROCEDURE — 82950 GLUCOSE TEST: CPT | Performed by: STUDENT IN AN ORGANIZED HEALTH CARE EDUCATION/TRAINING PROGRAM

## 2022-03-10 PROCEDURE — G8754 DIAS BP LESS 90: HCPCS | Performed by: STUDENT IN AN ORGANIZED HEALTH CARE EDUCATION/TRAINING PROGRAM

## 2022-03-10 PROCEDURE — 3052F HG A1C>EQUAL 8.0%<EQUAL 9.0%: CPT | Performed by: STUDENT IN AN ORGANIZED HEALTH CARE EDUCATION/TRAINING PROGRAM

## 2022-03-10 PROCEDURE — G8400 PT W/DXA NO RESULTS DOC: HCPCS | Performed by: STUDENT IN AN ORGANIZED HEALTH CARE EDUCATION/TRAINING PROGRAM

## 2022-03-10 PROCEDURE — 1101F PT FALLS ASSESS-DOCD LE1/YR: CPT | Performed by: STUDENT IN AN ORGANIZED HEALTH CARE EDUCATION/TRAINING PROGRAM

## 2022-03-10 PROCEDURE — 1090F PRES/ABSN URINE INCON ASSESS: CPT | Performed by: STUDENT IN AN ORGANIZED HEALTH CARE EDUCATION/TRAINING PROGRAM

## 2022-03-10 PROCEDURE — G8752 SYS BP LESS 140: HCPCS | Performed by: STUDENT IN AN ORGANIZED HEALTH CARE EDUCATION/TRAINING PROGRAM

## 2022-03-10 PROCEDURE — 99213 OFFICE O/P EST LOW 20 MIN: CPT | Performed by: STUDENT IN AN ORGANIZED HEALTH CARE EDUCATION/TRAINING PROGRAM

## 2022-03-10 NOTE — PROGRESS NOTES
Subjective   Anil Adame is a 80 y.o. female who presents for Diabetes (A1C f/u)    Diabetes  - Medications:  Januvia 100mg daily and Glipizide 5mg BID  - Taking meds as directed:  yes  - Side effects from medications:  no  - Low sugar/carbs in diet? Trying to cut out, still drinking juice, honey in tea, sweets  - Home blood sugar readings:  150s fasting, 170s after meals  - Blood sugar on last BMP was 240  - POC glucose 209, had meal 30 minutes prior   - Last A1c:   Lab Results   Component Value Date/Time    Hemoglobin A1c 9.0 (H) 12/17/2021 03:00 PM   - Last microalbumin:   Lab Results   Component Value Date/Time    Microalbumin/Creat ratio (mg/g creat)  09/07/2021 02:45 PM     Cannot calculate ratio due to creatinine result outside reportable range. Microalbumin,urine random 2.75 09/07/2021 02:45 PM   - Last DM eye exam:  Goes Monday  - Last DM foot exam:  Sees Podiatry, no records here  - Pneumonia vaccination:  yes  - Patient denies new or worsening polyuria, polydipsia. She denies hypoglycemic episodes. Review of Systems   Review of Systems   Constitutional: Negative for chills and fever. HENT: Negative for congestion and sore throat. Eyes: Negative for discharge and redness. Respiratory: Negative for cough and shortness of breath. Cardiovascular: Negative for chest pain and palpitations. Gastrointestinal: Negative for nausea and vomiting. Genitourinary: Negative for difficulty urinating and dysuria. Musculoskeletal: Negative for gait problem and neck stiffness. Neurological: Negative for weakness and numbness.           Medical History  Past Medical History:   Diagnosis Date    CAD (coronary artery disease)     Diabetes ([facility] [address]     Dyslipidemia     Endocrine disease     Hx of CABG     Hypertension     S/P triple vessel bypass 2005       Medications  Current Outpatient Medications   Medication Sig    Januvia 100 mg tablet Take 1 tablet by mouth once daily    glipiZIDE (GLUCOTROL) 5 mg tablet Take 1 Tablet by mouth two (2) times a day.  fosinopriL (MONOPRIL) 10 mg tablet Take 1 tablet by mouth once daily    omeprazole (PRILOSEC) 20 mg capsule Take 1 capsule by mouth once daily    nitroglycerin (NITRODUR) 0.4 mg/hr 1 Patch by TransDERmal route daily.  aspirin delayed-release 81 mg tablet Take 81 mg by mouth daily.  amLODIPine (NORVASC) 10 mg tablet Take 10 mg by mouth daily.  isosorbide mononitrate ER (IMDUR) 60 mg CR tablet 60 mg = 1 tab each dose, PO, daily, # 90 tab, 3 Refills, Pharmacy: [address]    rosuvastatin (CRESTOR) 40 mg tablet TAKE 1 TABLET BY MOUTH ONCE DAILY     No current facility-administered medications for this visit. Immunizations   Immunization History   Administered Date(s) Administered    COVID-19, [employer], Primary or Immunocompromised Series, MRNA, PF, 100mcg/0.5mL 01/10/2021, 02/06/2021, 12/17/2021       Allergies   Allergies   Allergen Reactions    Jardiance [Empagliflozin] Nausea Only     HYPOTENSION    Metformin Diarrhea       Objective   Vital Signs  Visit Vitals  /66   Pulse 62   Temp 97.7 °F (36.5 °C) (Oral)   Resp 18   Ht 5' 4\" (1.626 m)   Wt 154 lb (69.9 kg)   SpO2 100%   BMI 26.43 kg/m²       Physical Examination  Physical Exam  Vitals and nursing note reviewed. Constitutional:       General: She is not in acute distress. Appearance: Normal appearance. She is normal weight. HENT:      Head: Normocephalic and atraumatic. Right Ear: External ear normal.      Left Ear: External ear normal.      Nose: Nose normal.   Eyes:      General:         Right eye: No discharge. Left eye: No discharge. Extraocular Movements: Extraocular movements intact. Conjunctiva/sclera: Conjunctivae normal.   Cardiovascular:      Rate and Rhythm: Normal rate and regular rhythm. Pulses: Normal pulses. Heart sounds: Normal heart sounds.    Pulmonary:      Effort: Pulmonary effort is normal. Breath sounds: Normal breath sounds. Abdominal:      General: Abdomen is flat. Palpations: Abdomen is soft. Tenderness: There is no abdominal tenderness. Musculoskeletal:      Right lower leg: No edema. Left lower leg: No edema. Skin:     Capillary Refill: Capillary refill takes less than 2 seconds. Neurological:      General: No focal deficit present. Mental Status: She is alert and oriented to person, place, and time. Psychiatric:         Mood and Affect: Mood normal.         Behavior: Behavior normal.            Assessment and Plan   Joy Rangel is a 80 y.o. female who presents for Diabetes (A1C f/u)    1. Type 2 diabetes mellitus with other specified complication, without long-term current use of insulin ([facility])  - HbA1c today  - Yearly diabetic eye exam Monday  - Follow up with Podiatry   - Discussed lifestyle modifications, including: weight reduction, diet, 150 mins/week exercise, smoking cessation, limit ETOH intake  - Continue glipizide and januvia, will make changes as appropriate with A1c  - AMB POC GLUCOSE TEST  - CBC W/O DIFF; Future  - METABOLIC PANEL, BASIC; Future  - HEMOGLOBIN A1C WITH EAG; Future        ICD-10-CM ICD-9-CM    1. Type 2 diabetes mellitus with other specified complication, without long-term current use of insulin ([facility])  E11.69 250.80 AMB POC GLUCOSE TEST      CBC W/O DIFF      METABOLIC PANEL, BASIC      HEMOGLOBIN A1C WITH EAG       Follow-up and Dispositions    · Return in about 4 days (around 3/14/2022) for A1c lab visit. Pt was discussed with Dr. Denia Panda (attending physician). I have reviewed patient medical and social history and medications. I have reviewed pertinent labs results and other data. I have discussed the diagnosis with the patient and the intended plan as seen in the above orders. The patient has received an after-visit summary and questions were answered concerning future plans.  I have discussed medication side effects and warnings with the patient as well.     Leo Buitrago MD  Resident, [facility]  03/10/22

## 2022-03-10 NOTE — PATIENT INSTRUCTIONS
Learning About Carbohydrate (Carb) Counting and Eating Out When You Have Diabetes  Why plan your meals? Meal planning can be a key part of managing diabetes. Planning meals and snacks with the right balance of carbohydrate, protein, and fat can help you keep your blood sugar at the target level you set with your doctor. You don't have to eat special foods. You can eat what your family eats, including sweets once in a while. But you do have to pay attention to how often you eat and how much you eat of certain foods. You may want to work with a dietitian or a diabetes educator. They can give you tips and meal ideas and can answer your questions about meal planning. This health professional can also help you reach a healthy weight if that is one of your goals. What should you know about eating carbs? Managing the amount of carbohydrate (carbs) you eat is an important part of healthy meals when you have diabetes. Carbohydrate is found in many foods. · Learn which foods have carbs. And learn the amounts of carbs in different foods. ? Bread, cereal, pasta, and rice have about 15 grams of carbs in a serving. A serving is 1 slice of bread (1 ounce), ½ cup of cooked cereal, or 1/3 cup of cooked pasta or rice. ? Fruits have 15 grams of carbs in a serving. A serving is 1 small fresh fruit, such as an apple or orange; ½ of a banana; ½ cup of cooked or canned fruit; ½ cup of fruit juice; 1 cup of melon or raspberries; or 2 tablespoons of dried fruit. ? Milk and no-sugar-added yogurt have 15 grams of carbs in a serving. A serving is 1 cup of milk or 3/4 cup (6 oz) of no-sugar-added yogurt. ? Starchy vegetables have 15 grams of carbs in a serving. A serving is ½ cup of mashed potatoes or sweet potato; 1 cup winter squash; ½ of a small baked potato; ½ cup of cooked beans; or ½ cup cooked corn or green peas.   · Learn how much carbs to eat each day and at each meal. A dietitian or certified diabetes educator can teach you how to keep track of the amount of carbs you eat. This is called carbohydrate counting. · If you are not sure how to count carbohydrate grams, use the plate method to plan meals. It is a quick way to make sure that you have a balanced meal. It also can help you manage the amount of carbohydrate you eat at meals. ? Divide your plate by types of foods. Put non-starchy vegetables on half the plate, meat or other protein food on one-quarter of the plate, and a grain or starchy vegetable in the final quarter of the plate. To this you can add a small piece of fruit and 1 cup of milk or yogurt, depending on how many carbs you are supposed to eat at a meal.  · Try to eat about the same amount of carbs at each meal. Do not \"save up\" your daily allowance of carbs to eat at one meal.  · Proteins have very little or no carbs. Examples of proteins are beef, chicken, turkey, fish, eggs, tofu, cheese, cottage cheese, and peanut butter. How can you eat out and still eat healthy? · Learn to estimate the serving sizes of foods that have carbohydrate. If you measure food at home, it will be easier to estimate the amount in a serving of restaurant food. · If the meal you order has too much carbohydrate (such as potatoes, corn, or baked beans), ask to have a low-carbohydrate food instead. Ask for a salad or non-starchy vegetables like broccoli, cauliflower, green beans, or peppers. · If you eat more carbohydrate at a meal than you had planned, take a walk or do other exercise. This will help lower your blood sugar. What are some tips for eating healthy? · Limit saturated fat, such as the fat from meat and dairy products. This is a healthy choice because people who have diabetes are at higher risk of heart disease. So choose lean cuts of meat and nonfat or low-fat dairy products. Use olive or canola oil instead of butter or shortening when cooking. · Don't skip meals.  Your blood sugar may drop too low if you skip meals and take insulin or certain medicines for diabetes. · Check with your doctor before you drink alcohol. Alcohol can cause your blood sugar to drop too low. Alcohol can also cause a bad reaction if you take certain diabetes medicines. Follow-up care is a key part of your treatment and safety. Be sure to make and go to all appointments, and call your doctor if you are having problems. It's also a good idea to know your test results and keep a list of the medicines you take. Where can you learn more? Go to http://www.bird.com/  Enter I147 in the search box to learn more about \"Learning About Carbohydrate (Carb) Counting and Eating Out When You Have Diabetes. \"  Current as of: September 8, 2021               Content Version: 13.2  © 2006-2022 Healthwise, Inc[employer]. Care instructions adapted under license by [facility] (which disclaims liability or warranty for this information). If you have questions about a medical condition or this instruction, always ask your healthcare professional. [place] [address] any warranty or liability for your use of this information.

## 2022-03-10 NOTE — PROGRESS NOTES
Kalpesh Ramires is a 80 y.o. female    Chief Complaint   Patient presents with    Diabetes     A1C f/u       Visit Vitals  /66   Pulse 62   Temp 97.7 °F (36.5 °C) (Oral)   Resp 18   Ht 5' 4\" (1.626 m)   Wt 154 lb (69.9 kg)   SpO2 100%   BMI 26.43 kg/m²       3 most recent PHQ Screens 3/10/2022   Little interest or pleasure in doing things Not at all   Feeling down, depressed, irritable, or hopeless Not at all   Total Score PHQ 2 0       Fall Risk Assessment, last 12 mths 3/10/2022   Able to walk? Yes   Fall in past 12 months? 0   Do you feel unsteady? -   Are you worried about falling -   Is the gait abnormal? -   Number of falls in past 12 months -       No flowsheet data found. 1. Have you been to the ER, urgent care clinic since your last visit? Hospitalized since your last visit? No     2. Have you seen or consulted any other health care providers outside of the [address] [place] [address] since your last visit? Include any pap smears or colon screening.  No

## 2022-03-14 ENCOUNTER — LAB ONLY (OUTPATIENT)
Dept: FAMILY MEDICINE CLINIC | Age: 82
End: 2022-03-14
Payer: MEDICARE

## 2022-03-14 DIAGNOSIS — E11.69 TYPE 2 DIABETES MELLITUS WITH OTHER SPECIFIED COMPLICATION, WITHOUT LONG-TERM CURRENT USE OF INSULIN (HCC): ICD-10-CM

## 2022-03-14 PROCEDURE — 83036 HEMOGLOBIN GLYCOSYLATED A1C: CPT

## 2022-03-15 LAB
ANION GAP SERPL CALC-SCNC: 2 MMOL/L (ref 5–15)
BUN SERPL-MCNC: 14 MG/DL (ref 6–20)
BUN/CREAT SERPL: 18 (ref 12–20)
CALCIUM SERPL-MCNC: 8.8 MG/DL (ref 8.5–10.1)
CHLORIDE SERPL-SCNC: 108 MMOL/L (ref 97–108)
CO2 SERPL-SCNC: 27 MMOL/L (ref 21–32)
CREAT SERPL-MCNC: 0.8 MG/DL (ref 0.55–1.02)
ERYTHROCYTE [DISTWIDTH] IN BLOOD BY AUTOMATED COUNT: 13.9 % (ref 11.5–14.5)
EST. AVERAGE GLUCOSE BLD GHB EST-MCNC: 189 MG/DL
GLUCOSE SERPL-MCNC: 160 MG/DL (ref 65–100)
HBA1C MFR BLD: 8.2 % (ref 4–5.6)
HCT VFR BLD AUTO: 38.7 % (ref 35–47)
HGB BLD-MCNC: 12.3 G/DL (ref 11.5–16)
MCH RBC QN AUTO: 26.6 PG (ref 26–34)
MCHC RBC AUTO-ENTMCNC: 31.8 G/DL (ref 30–36.5)
MCV RBC AUTO: 83.8 FL (ref 80–99)
NRBC # BLD: 0 K/UL (ref 0–0.01)
NRBC BLD-RTO: 0 PER 100 WBC
PLATELET # BLD AUTO: 197 K/UL (ref 150–400)
PMV BLD AUTO: 10.4 FL (ref 8.9–12.9)
POTASSIUM SERPL-SCNC: 4.1 MMOL/L (ref 3.5–5.1)
RBC # BLD AUTO: 4.62 M/UL (ref 3.8–5.2)
SODIUM SERPL-SCNC: 137 MMOL/L (ref 136–145)
WBC # BLD AUTO: 9.8 K/UL (ref 3.6–11)

## 2022-03-18 PROBLEM — E11.69 TYPE 2 DIABETES MELLITUS WITH OTHER SPECIFIED COMPLICATION (HCC): Status: ACTIVE | Noted: 2021-03-19

## 2022-03-19 PROBLEM — J30.9 ALLERGIC RHINITIS: Status: ACTIVE | Noted: 2021-09-07

## 2022-03-19 PROBLEM — Z95.5 PRESENCE OF CORONARY ANGIOPLASTY IMPLANT AND GRAFT: Status: ACTIVE | Noted: 2021-09-07

## 2022-03-19 PROBLEM — K21.9 GASTROESOPHAGEAL REFLUX DISEASE: Status: ACTIVE | Noted: 2021-09-07

## 2022-04-22 DIAGNOSIS — E11.69 TYPE 2 DIABETES MELLITUS WITH OTHER SPECIFIED COMPLICATION, UNSPECIFIED WHETHER LONG TERM INSULIN USE (HCC): ICD-10-CM

## 2022-04-22 RX ORDER — GLIPIZIDE 5 MG/1
TABLET ORAL
Qty: 90 TABLET | Refills: 0 | Status: SHIPPED | OUTPATIENT
Start: 2022-04-22

## 2022-04-25 ENCOUNTER — TELEPHONE (OUTPATIENT)
Dept: FAMILY MEDICINE CLINIC | Age: 82
End: 2022-04-25

## 2022-04-25 NOTE — TELEPHONE ENCOUNTER
I called pt to schedule medicare wellness visit and vv ov  To go over medication. I left pt a v/m to give us a call back.     Pre Dr Joanne Maguire

## 2022-05-02 ENCOUNTER — VIRTUAL VISIT (OUTPATIENT)
Dept: FAMILY MEDICINE CLINIC | Age: 82
End: 2022-05-02
Payer: MEDICARE

## 2022-05-02 ENCOUNTER — TELEPHONE (OUTPATIENT)
Dept: FAMILY MEDICINE CLINIC | Age: 82
End: 2022-05-02

## 2022-05-02 DIAGNOSIS — Z00.00 MEDICARE ANNUAL WELLNESS VISIT, SUBSEQUENT: ICD-10-CM

## 2022-05-02 DIAGNOSIS — Z78.0 POSTMENOPAUSAL STATE: ICD-10-CM

## 2022-05-02 DIAGNOSIS — Z71.89 ADVANCED CARE PLANNING/COUNSELING DISCUSSION: Primary | ICD-10-CM

## 2022-05-02 DIAGNOSIS — Z11.59 NEED FOR HEPATITIS C SCREENING TEST: ICD-10-CM

## 2022-05-02 DIAGNOSIS — I25.708 CORONARY ARTERY DISEASE OF BYPASS GRAFT OF NATIVE HEART WITH STABLE ANGINA PECTORIS (HCC): ICD-10-CM

## 2022-05-02 PROCEDURE — G8427 DOCREV CUR MEDS BY ELIG CLIN: HCPCS | Performed by: FAMILY MEDICINE

## 2022-05-02 PROCEDURE — G8510 SCR DEP NEG, NO PLAN REQD: HCPCS | Performed by: FAMILY MEDICINE

## 2022-05-02 PROCEDURE — G8400 PT W/DXA NO RESULTS DOC: HCPCS | Performed by: FAMILY MEDICINE

## 2022-05-02 PROCEDURE — 1101F PT FALLS ASSESS-DOCD LE1/YR: CPT | Performed by: FAMILY MEDICINE

## 2022-05-02 PROCEDURE — G8756 NO BP MEASURE DOC: HCPCS | Performed by: FAMILY MEDICINE

## 2022-05-02 PROCEDURE — G0439 PPPS, SUBSEQ VISIT: HCPCS | Performed by: FAMILY MEDICINE

## 2022-05-02 NOTE — TELEPHONE ENCOUNTER
Pt will give us a call back to schedule lab appt per Dr Nirav Varghese. And pt have to sign medical release form to receive records from [facility] to receive pt results.

## 2022-05-02 NOTE — PATIENT INSTRUCTIONS
Medicare Wellness Visit, Female     The best way to live healthy is to have a lifestyle where you eat a well-balanced diet, exercise regularly, limit alcohol use, and quit all forms of tobacco/nicotine, if applicable. Regular preventive services are another way to keep healthy. Preventive services (vaccines, screening tests, monitoring & exams) can help personalize your care plan, which helps you manage your own care. Screening tests can find health problems at the earliest stages, when they are easiest to treat. [facility] follows the current, evidence-based guidelines published by the [facility] ([facility]) when recommending preventive services for our patients. Because we follow these guidelines, sometimes recommendations change over time as research supports it. (For example, mammograms used to be recommended annually. Even though Medicare will still pay for an annual mammogram, the newer guidelines recommend a mammogram every two years for women of average risk). Of course, you and your doctor may decide to screen more often for some diseases, based on your risk and your co-morbidities (chronic disease you are already diagnosed with). Preventive services for you include:  - Medicare offers their members a free annual wellness visit, which is time for you and your primary care provider to discuss and plan for your preventive service needs. Take advantage of this benefit every year!  -All adults over the age of 72 should receive the recommended pneumonia vaccines. Current USPSTF guidelines recommend a series of two vaccines for the best pneumonia protection.   -All adults should have a flu vaccine yearly and a tetanus vaccine every 10 years.   -All adults age 48 and older should receive the shingles vaccines (series of two vaccines).       -All adults age 38-68 who are overweight should have a diabetes screening test once every three years.   -All adults born between [birth year] and 1963 should be screened once for Hepatitis C.  -Other screening tests and preventive services for persons with diabetes include: an eye exam to screen for diabetic retinopathy, a kidney function test, a foot exam, and stricter control over your cholesterol.   -Cardiovascular screening for adults with routine risk involves an electrocardiogram (ECG) at intervals determined by your doctor.   -Colorectal cancer screenings should be done for adults age 54-65 with no increased risk factors for colorectal cancer. There are a number of acceptable methods of screening for this type of cancer. Each test has its own benefits and drawbacks. Discuss with your doctor what is most appropriate for you during your annual wellness visit. The different tests include: colonoscopy (considered the best screening method), a fecal occult blood test, a fecal DNA test, and sigmoidoscopy.    -A bone mass density test is recommended when a woman turns 65 to screen for osteoporosis. This test is only recommended one time, as a screening. Some providers will use this same test as a disease monitoring tool if you already have osteoporosis. -Breast cancer screenings are recommended every other year for women of normal risk, age 54-69.  -Cervical cancer screenings for women over age 72 are only recommended with certain risk factors.      Here is a list of your current Health Maintenance items (your personalized list of preventive services) with a due date:  Health Maintenance Due   Topic Date Due    Hepatitis C Test  Never done    Pneumococcal Vaccine (1 - PCV) Never done    Diabetic Foot Care  Never done    DTaP/Tdap/Td  (1 - Tdap) Never done    Shingles Vaccine (1 of 2) Never done    Bone Mineral Density   Never done

## 2022-05-02 NOTE — PROGRESS NOTES
This is the Subsequent Medicare Annual Wellness Exam, performed 12 months or more after the Initial AWV or the last Subsequent AWV    I have reviewed the patient's medical history in detail and updated the computerized patient record. Assessment/Plan   Education and counseling provided:  Are appropriate based on today's review and evaluation  End-of-Life planning (with patient's consent)  Pneumococcal Vaccine  Cardiovascular screening blood test  Bone mass measurement (DEXA)  Diabetes screening test    1. Advanced care planning/counseling discussion  -     REFERRAL TO ACP CLINICAL SPECIALIST  2. Medicare annual wellness visit, subsequent  -     REFERRAL TO ACP CLINICAL SPECIALIST  3. Need for hepatitis C screening test  -     HEPATITIS C AB; Future  4. Postmenopausal state  -     DEXA BONE DENSITY STUDY AXIAL; Future  5. Coronary artery disease of bypass graft of native heart with stable angina pectoris [facility])    She is unsure about her code status. States she would like some time to think about this and will get names and phone numbers together to decide on ACP. Will have ACP specialist reach out to complete this discussion and any paperwork that is needed. Will need medical records released from [facility]. States she got both PCV from [facility] and states she is unsure when her last tetanus shot was. States this was about 3 years ago. We also need records from her podiatrist who recently completed her diabetic foot exam.  She is unsure who this make. She will find this paperwork and I will have our PSR reach out to complete medical record release forms. Never had DEXA. Will order this. Number to central scheduling given. Follow-up and Dispositions    · Return in about 4 months (around 9/2/2022) for Diabetes(A1C check) and 12/2022 for lipid panel and annual labs .          Depression Risk Factor Screening:     3 most recent PHQ Screens 5/2/2022   PHQ Not Done Patient refuses   Little interest or pleasure in doing things Not at all   Feeling down, depressed, irritable, or hopeless Not at all   Total Score PHQ 2 0       Alcohol & Drug Abuse Risk Screen    Do you average more than 1 drink per night or more than 7 drinks a week:  No    On any one occasion in the past three months have you have had more than 3 drinks containing alcohol:  No          Functional Ability and Level of Safety:    Hearing: Hearing is good. Activities of Daily Living: The home contains: handrails and grab bars  Patient does total self care      Ambulation: with no difficulty     Fall Risk:  Fall Risk Assessment, last 12 mths 5/2/2022   Able to walk? Yes   Fall in past 12 months? 0   Do you feel unsteady?  0   Are you worried about falling 0   Is the gait abnormal? -   Number of falls in past 12 months -      Abuse Screen:  Patient is not abused       Cognitive Screening    Has your family/caregiver stated any concerns about your memory: no    Cognitive Screening: Normal - Animal Naming Test    Health Maintenance Due     Health Maintenance Due   Topic Date Due    Hepatitis C Screening  Never done    Pneumococcal 65+ years (1 - PCV) Never done    Foot Exam Q1  Never done    DTaP/Tdap/Td series (1 - Tdap) Never done    Shingrix Vaccine Age 50> (1 of 2) Never done    Bone Densitometry (Dexa) Screening  Never done       Patient Care Team   Patient Care Team:  Inez Timmons MD as PCP - General (Sports Medicine)  Inez Timmons MD as PCP - [facility]  Jennifer Kendall MD (Cardiology)  Gisel Ritter (Ophthalmology)    History     Patient Active Problem List   Diagnosis Code    Type 2 diabetes mellitus with other specified complication [facility]) A77.08    Coronary artery disease of bypass graft of native heart with stable angina pectoris ([facility] [address] I25.708    Hx of CABG Z95.1    Hypertension I10    Dyslipidemia E78.5    Presence of coronary angioplasty implant and graft Z95.5    Gastroesophageal reflux disease K21.9    Allergic rhinitis J30.9     Past Medical History:   Diagnosis Date    CAD (coronary artery disease)     Diabetes ([facility] [address]     Dyslipidemia     Endocrine disease     Hx of CABG     Hypertension     S/P triple vessel bypass       Past Surgical History:   Procedure Laterality Date    HX CORONARY ARTERY BYPASS GRAFT      HX HYSTERECTOMY       Current Outpatient Medications   Medication Sig Dispense Refill    rosuvastatin (CRESTOR) 40 mg tablet TAKE 1 TABLET BY MOUTH ONCE DAILY 90 Tablet 3    glipiZIDE (GLUCOTROL) 5 mg tablet Take 1 tablet by mouth twice daily 90 Tablet 0    fosinopriL (MONOPRIL) 10 mg tablet Take 1 tablet by mouth once daily 60 Tablet 0    Januvia 100 mg tablet Take 1 tablet by mouth once daily 90 Tablet 1    nitroglycerin (NITRODUR) 0.4 mg/hr 1 Patch by TransDERmal route daily. 90 Patch 3    aspirin delayed-release 81 mg tablet Take 81 mg by mouth daily.  amLODIPine (NORVASC) 10 mg tablet Take 10 mg by mouth daily.  isosorbide mononitrate ER (IMDUR) 60 mg CR tablet 60 mg = 1 tab each dose, PO, daily, # 90 tab, 3 Refills, Pharmacy: [address] [id number] 30 Tab 0     Allergies   Allergen Reactions    Jardiance [Empagliflozin] Nausea Only     HYPOTENSION    Metformin Diarrhea       Family History   Problem Relation Age of Onset    Heart Attack Mother         At  home while viewing her mother     Cancer Father      Social History     Tobacco Use    Smoking status: Never Smoker    Smokeless tobacco: Never Used   Substance Use Topics    Alcohol use: Not Currently       [place], was evaluated through a synchronous (real-time) audio-video encounter. The patient (or guardian if applicable) is aware that this is a billable service, which includes applicable co-pays. Verbal consent to proceed has been obtained.  The visit was conducted pursuant to the emergency declaration under the [address] waiver authority and the Coronavirus Preparedness and Response Supplemental Appropriations Act. Patient identification was verified, and a caregiver was present when appropriate. The patient was located at home in a state where the provider was licensed to provide care.        Lauren Garcia MD

## 2022-05-03 ENCOUNTER — PATIENT OUTREACH (OUTPATIENT)
Dept: CASE MANAGEMENT | Age: 82
End: 2022-05-03

## 2022-05-13 ENCOUNTER — PATIENT OUTREACH (OUTPATIENT)
Dept: CASE MANAGEMENT | Age: 82
End: 2022-05-13

## 2022-05-13 NOTE — ACP (ADVANCE CARE PLANNING)
Advance Care Planning   Ambulatory ACP Specialist Patient Outreach    Date:  5/13/2022    ACP Specialist:  Shaheen Mckeon LPN    Outreach call to patient in follow-up to ACP Specialist referral from:    [x] PCP  [] Provider   [] Ambulatory Care Management [] Other     For:                  [x] Advance Directive Assistance              [] Complete Portable DNR order              [] Complete POST/MOST              [x] Code Status Discussion             [x] Discuss Goals of Care             [] Early ACP Decision-Making              [] Other (Specify)    Date Referral Received: 5/2/22    Today's Outreach:  [] First   [x] Second  [] Third       Third outreach made by: [] Phone  [] Email / mail    [] [place]     Intervention:  [] Spoke with Patient   [x] Left VM requesting return call      Outcome: The second attempt was made to contact pt regarding ACP referral. No answer on mobile phone. VM left requesting a return call. Will outreach again within one week. Next Step:   [] ACP scheduled conversation  [x] Outreach again in one week               [] Email / Mail ACP Info Sheets  [] Email / Mail Advance Directive   [] Closing referral.  Routing closure to referring provider/staff and to ACP Specialist . [] Closure letter mailed to patient with invitation to contact ACP Specialist if / when ready.   Thank you for this referral.

## 2022-05-17 RX ORDER — NITROGLYCERIN 80 MG/1
1 PATCH TRANSDERMAL DAILY
Qty: 90 PATCH | Refills: 1 | Status: SHIPPED | OUTPATIENT
Start: 2022-05-17

## 2022-05-17 NOTE — TELEPHONE ENCOUNTER
Future Appointments   Date Time Provider Maikol Torrezi   5/26/2022 11:00 AM [facility] DEXA 1 [facility]   6/16/2022  9:20 AM Job MD Michael [facility] AMB     Uses both nitro & Imdur

## 2022-05-17 NOTE — TELEPHONE ENCOUNTER
Refill per VO of Dr. Nicolás Baltazar  Last appt: 2021  Future Appointments   Date Time Provider Maikol Génesis   2022 11:00 AM [facility] DEXA 1 [facility]   2022  9:20 AM Tiffany Verduzco MD [facility] BS AMB       Requested Prescriptions     Pending Prescriptions Disp Refills    nitroglycerin (NITRODUR) 0.4 mg/hr 90 Patch 3     Si Patch by TransDERmal route daily.

## 2022-05-19 ENCOUNTER — PATIENT OUTREACH (OUTPATIENT)
Dept: CASE MANAGEMENT | Age: 82
End: 2022-05-19

## 2022-05-19 NOTE — ACP (ADVANCE CARE PLANNING)
Advance Care Planning   Ambulatory ACP Specialist Patient Outreach    Date:  5/19/2022    ACP Specialist:  Alina Aguirre LPN    Outreach call to patient in follow-up to ACP Specialist referral from:    [x] PCP  [] Provider   [] Ambulatory Care Management [] Other     For:                  [x] Advance Directive Assistance              [] Complete Portable DNR order              [] Complete POST/MOST              [x] Code Status Discussion             [x] Discuss Goals of Care             [] Early ACP Decision-Making              [] Other (Specify)    Date Referral Received: 5/2/22    Today's Outreach:  [] First   [] Second  [x] Third       Third outreach made by: [] Phone  [x] Email / mail    [] [place]     Intervention:  [] Spoke with Patient   [] Left VM requesting return call      Outcome: The final attempt was made to reach the pt. ACP documents sent to pt via e-mail. LPN's contact information was included. We will close the referral at this time. Next Step:   [] ACP scheduled conversation  [] Outreach again in one week               [x] Email / Mail ACP Info Sheets  [] Email / Mail Advance Directive   [x] Closing referral.  Routing closure to referring provider/staff and to ACP Specialist . [x] Closure letter mailed to patient with invitation to contact ACP Specialist if / when ready.   Thank you for this referral.

## 2022-05-26 ENCOUNTER — HOSPITAL ENCOUNTER (OUTPATIENT)
Dept: MAMMOGRAPHY | Age: 82
Discharge: HOME | End: 2022-05-26
Attending: FAMILY MEDICINE
Payer: MEDICARE

## 2022-05-26 DIAGNOSIS — Z78.0 POSTMENOPAUSAL STATE: ICD-10-CM

## 2022-05-26 PROCEDURE — 77080 DXA BONE DENSITY AXIAL: CPT

## 2022-06-03 DIAGNOSIS — S22.009A CLOSED FRACTURE OF THORACIC VERTEBRA, UNSPECIFIED FRACTURE MORPHOLOGY, UNSPECIFIED THORACIC VERTEBRAL LEVEL, INITIAL ENCOUNTER (HCC): Primary | ICD-10-CM

## 2022-06-16 ENCOUNTER — OFFICE VISIT (OUTPATIENT)
Dept: CARDIOLOGY CLINIC | Age: 82
End: 2022-06-16
Payer: MEDICARE

## 2022-06-16 VITALS
BODY MASS INDEX: 25.78 KG/M2 | OXYGEN SATURATION: 97 % | HEART RATE: 54 BPM | WEIGHT: 151 LBS | DIASTOLIC BLOOD PRESSURE: 68 MMHG | SYSTOLIC BLOOD PRESSURE: 138 MMHG | HEIGHT: 64 IN

## 2022-06-16 DIAGNOSIS — I25.708 CORONARY ARTERY DISEASE OF BYPASS GRAFT OF NATIVE HEART WITH STABLE ANGINA PECTORIS (HCC): Primary | ICD-10-CM

## 2022-06-16 DIAGNOSIS — E78.5 DYSLIPIDEMIA: ICD-10-CM

## 2022-06-16 DIAGNOSIS — I10 PRIMARY HYPERTENSION: ICD-10-CM

## 2022-06-16 PROCEDURE — G8752 SYS BP LESS 140: HCPCS | Performed by: SPECIALIST

## 2022-06-16 PROCEDURE — G8417 CALC BMI ABV UP PARAM F/U: HCPCS | Performed by: SPECIALIST

## 2022-06-16 PROCEDURE — 1101F PT FALLS ASSESS-DOCD LE1/YR: CPT | Performed by: SPECIALIST

## 2022-06-16 PROCEDURE — G8399 PT W/DXA RESULTS DOCUMENT: HCPCS | Performed by: SPECIALIST

## 2022-06-16 PROCEDURE — 1090F PRES/ABSN URINE INCON ASSESS: CPT | Performed by: SPECIALIST

## 2022-06-16 PROCEDURE — 1123F ACP DISCUSS/DSCN MKR DOCD: CPT | Performed by: SPECIALIST

## 2022-06-16 PROCEDURE — G8754 DIAS BP LESS 90: HCPCS | Performed by: SPECIALIST

## 2022-06-16 PROCEDURE — 99214 OFFICE O/P EST MOD 30 MIN: CPT | Performed by: SPECIALIST

## 2022-06-16 PROCEDURE — G8427 DOCREV CUR MEDS BY ELIG CLIN: HCPCS | Performed by: SPECIALIST

## 2022-06-16 PROCEDURE — G8536 NO DOC ELDER MAL SCRN: HCPCS | Performed by: SPECIALIST

## 2022-06-16 PROCEDURE — G8510 SCR DEP NEG, NO PLAN REQD: HCPCS | Performed by: SPECIALIST

## 2022-06-16 NOTE — PROGRESS NOTES
Chief Complaint   Patient presents with    Follow-up     6 mo     Coronary Artery Disease    Hypertension    Cholesterol Problem     There were no vitals taken for this visit.   Chest pain denied   SOB denied   Palpitations denied   Swelling in hands/feet denied   Dizziness denied   Recent hospital stays denied   Refills denied

## 2022-06-16 NOTE — PROGRESS NOTES
Benjamin Gunderson MD. [facility]              Patient: Vijay Villasenor  : 1940      Today's Date: 2022          HISTORY OF PRESENT ILLNESS:     History of Present Illness:     She has a history of CAD/CABG. Doing well overall. Remains active. PAST MEDICAL HISTORY:     Past Medical History:   Diagnosis Date    CAD (coronary artery disease)     Diabetes ([address]     Dyslipidemia     Endocrine disease     Hx of CABG     Hypertension     S/P triple vessel bypass            MEDICATIONS:     Current Outpatient Medications   Medication Sig Dispense Refill    nitroglycerin (NITRODUR) 0.4 mg/hr 1 Patch by TransDERmal route daily. 90 Patch 1    rosuvastatin (CRESTOR) 40 mg tablet TAKE 1 TABLET BY MOUTH ONCE DAILY 90 Tablet 3    glipiZIDE (GLUCOTROL) 5 mg tablet Take 1 tablet by mouth twice daily 90 Tablet 0    fosinopriL (MONOPRIL) 10 mg tablet Take 1 tablet by mouth once daily 60 Tablet 0    Januvia 100 mg tablet Take 1 tablet by mouth once daily 90 Tablet 1    aspirin delayed-release 81 mg tablet Take 81 mg by mouth daily.  amLODIPine (NORVASC) 10 mg tablet Take 10 mg by mouth daily.       isosorbide mononitrate ER (IMDUR) 60 mg CR tablet 60 mg = 1 tab each dose, PO, daily, # 90 tab, 3 Refills, Pharmacy: [address] 30 Tab 0       Allergies   Allergen Reactions    Jardiance [Empagliflozin] Nausea Only     HYPOTENSION    Metformin Diarrhea             SOCIAL HISTORY:     Social History     Tobacco Use    Smoking status: Never Smoker    Smokeless tobacco: Never Used   Vaping Use    Vaping Use: Never used   Substance Use Topics    Alcohol use: Not Currently    Drug use: Never       FAMILY HISTORY:     Family History   Problem Relation Age of Onset    Heart Attack Mother         At  home while viewing her mother     Cancer Father              REVIEW OF SYMPTOMS:     Review of Symptoms:  Constitutional: Negative for fever, chills  HEENT: Negative for nosebleeds, tinnitus, and vision changes. Respiratory: Negative for cough, wheezing  Cardiovascular: Negative for orthopnea, claudication, syncope, and PND. Gastrointestinal: Negative for abdominal pain, diarrhea, melena. Genitourinary: Negative for dysuria  Musculoskeletal: + arthritis   Skin: Negative for rash  Heme: No problems bleeding. Neurological: Negative for speech change and focal weakness. PHYSICAL EXAM:     Physical Exam:  Visit Vitals  /68 (BP 1 Location: Left upper arm, BP Patient Position: Sitting)   Pulse (!) 54   Ht 5' 4\" (1.626 m)   Wt 151 lb (68.5 kg)   SpO2 97%   BMI 25.92 kg/m²     Patient appears generally well, mood and affect are appropriate and pleasant. HEENT:  Hearing intact, non-icteric, normocephalic, atraumatic. Neck Exam: Supple, No JVD or carotid bruits. Lung Exam: Clear to auscultation, even breath sounds. Cardiac Exam: Regular rate and rhythm with 2/6 systolic murmur  Abdomen: Soft, non-tender   Extremities: Moves all ext well. No lower extremity edema. MSKTL: Overall good ROM ext  Skin: No significant rashes  Psych: Appropriate affect  Neuro - Grossly intact      LABS / OTHER STUDIES reviewed:       Lab Results   Component Value Date/Time    Sodium 137 03/14/2022 03:45 PM    Potassium 4.1 03/14/2022 03:45 PM    Chloride 108 03/14/2022 03:45 PM    CO2 27 03/14/2022 03:45 PM    Anion gap 2 (L) 03/14/2022 03:45 PM    Glucose 160 (H) 03/14/2022 03:45 PM    BUN 14 03/14/2022 03:45 PM    Creatinine 0.80 03/14/2022 03:45 PM    BUN/Creatinine ratio 18 03/14/2022 03:45 PM    GFR est AA >60 03/14/2022 03:45 PM    GFR est non-AA >60 03/14/2022 03:45 PM    Calcium 8.8 03/14/2022 03:45 PM    Bilirubin, total 0.3 11/20/2021 02:36 PM    Alk.  phosphatase 91 11/20/2021 02:36 PM    Protein, total 7.2 11/20/2021 02:36 PM    Albumin 3.6 11/20/2021 02:36 PM    Globulin 3.6 11/20/2021 02:36 PM    A-G Ratio 1.0 (L) 11/20/2021 02:36 PM    ALT (SGPT) 15 11/20/2021 02:36 PM AST (SGOT) 17 11/20/2021 02:36 PM     Lab Results   Component Value Date/Time    WBC 9.8 03/14/2022 03:45 PM    HGB 12.3 03/14/2022 03:45 PM    HCT 38.7 03/14/2022 03:45 PM    PLATELET 070 56/17/5436 03:45 PM    MCV 83.8 03/14/2022 03:45 PM       Lab Results   Component Value Date/Time    Cholesterol, total 135 12/17/2021 03:00 PM    HDL Cholesterol 57 12/17/2021 03:00 PM    LDL, calculated 64.2 12/17/2021 03:00 PM    VLDL, calculated 13.8 12/17/2021 03:00 PM    Triglyceride 69 12/17/2021 03:00 PM    CHOL/HDL Ratio 2.4 12/17/2021 03:00 PM       Lab Results   Component Value Date/Time    TSH 1.51 02/16/2021 11:00 AM     Lab Results   Component Value Date/Time    Hemoglobin A1c 8.2 (H) 03/14/2022 03:45 PM             CARDIAC DIAGNOSTICS:     Cardiac Evaluation Includes:  I reviewed the results below. She was seen at [facility] for CAD (s/p CABG; 2 stents to LCX in 2009)     CABG 5/2005 --> LIMA to LAD, SVG to Cx and PDA    Echo 6/17 - LVEF 50-55%    Lexiscan Cardiolite 8/14/18 - small sized (4%) reversible inferoseptal defect. LVEF 67%    Chest CTA 9/14/18 - no evidence acute traumatic injury. Right sided aortic arch. Cardiac Cath ([facility]) 10/18 - patent grafts (LIMA to LAD, SVG-T to PDA and OM1) - treated medically - if persistent symptoms then consider PCI to pLAD and mLCX    Echo 10/24/21 - LVEF 60%, mild LAE       EKG 2/10/21 - NSR, old inferior infarct - I viewed myself   EKG 11/20/21 - sinus roscoe, inferior infarct         ASSESSMENT AND PLAN:     Assessment and Plan:  1) CAD  - History of CABG (was managed at [facility] for years)   - Cardiac Cath ([facility]) 10/18 - patent grafts (LIMA to LAD, SVG-T to PDA and OM1) - treated medically - if persistent symptoms then consider PCI to pLAD and mLCX  - Echo 10/24/21 - LVEF 60%  - She feels well overall without significant angina.    - cont statin, ASA, CCB, Imdur     2) HTN  - BP OK - cont meds     3) Dyslipidemia   - recent LDL OK   - cont statin     4) Diabetes   - per PCP     5) See me back in 12 months     Lives with  (COPD). Has 3 kids. She was a . Griselda Mowers, MD, [place] [address].  [address], [facility]. Bridgett Bah.  [place], [address] [place] [address]  Ph: [phone]   Ph [phone]

## 2023-05-19 ENCOUNTER — TELEPHONE (OUTPATIENT)
Age: 83
End: 2023-05-19

## 2023-06-29 NOTE — TELEPHONE ENCOUNTER
-- DO NOT REPLY / DO NOT REPLY ALL --  -- Message is from Engagement Center Operations (ECO) --    General Patient Message:     The patient is calling again because she has not yet received a call back pertaining to rescheduling her appointment set for 7/6/23 10:30        Alternative phone number: [phone]  Can a detailed message be left? Yes    Message Turnaround: [place]:    Refer to site's KB page for routing instructions    Please give this turnaround time to the caller:   \"You can expect to receive a response 1-3 business days after your provider's clinical team reviews the message\"               Place Podiatry ref order into CC and let me know once order is placed so that I can work it and send patient's info over to Podiatry office for scheduling    Thanks  EMILY OWENS/BISHOP FLOWERS Referral Coordinator

## 2023-07-15 NOTE — PROGRESS NOTES
Cortez Gracia MD. [facility] [place]              Patient: Lamar Pham  : 1940      Today's Date: 2021          HISTORY OF PRESENT ILLNESS:     History of Present Illness:     She has a history of CAD/CABG. Was in ED last month - doc noted \"emergency room with complaints of generally \"not feeling well. \" Patient states she started developing body aches and pains yesterday. Reports feeling \"fullness in her chest.\" States she did take 1 sublingual nitroglycerin that \"kind of let up on the pain. \" Patient states she went to sleep woke up this morning and was feeling \"dehydrated. \" States the feeling of general malaise has worsened over the period of the day prompting an emergency room visit. States she is continues to have the full feeling in her chest since this afternoon. \"  ED workup OK and she was sent home. She says she had some problems with her her shoulder and mild chest discomfort - she felt dehydrated. She is feeling better now. No more chest pain. A little SOB rushing. She feels well now. PAST MEDICAL HISTORY:     Past Medical History:   Diagnosis Date    CAD (coronary artery disease)     Diabetes ([address]     Dyslipidemia     Endocrine disease     Hx of CABG     Hypertension     S/P triple vessel bypass            MEDICATIONS:     Current Outpatient Medications   Medication Sig Dispense Refill    SITagliptin (JANUVIA) 100 mg tablet Take 1 Tablet by mouth daily. 90 Tablet 0    nitroglycerin (NITRODUR) 0.4 mg/hr 1 Patch by TransDERmal route daily. 90 Patch 3    aspirin delayed-release 81 mg tablet Take 81 mg by mouth daily.  ondansetron (Zofran ODT) 4 mg disintegrating tablet 1 Tab by SubLINGual route every eight (8) hours as needed for Nausea or Vomiting. 20 Tab 0    amLODIPine (NORVASC) 10 mg tablet Take 10 mg by mouth daily.  fosinopriL (MONOPRIL) 10 mg tablet Take 1 Tab by mouth daily.  90 Tab 1    isosorbide mononitrate ER (IMDUR) 60 mg CR tablet 60 mg = 1 tab each dose, PO, daily, # 90 tab, 3 Refills, Pharmacy: [address] 30 Tab 0    omeprazole (PRILOSEC) 20 mg capsule 20 mg = 1 CAP each dose, PO, daily, # 90 CAP, 3 Refills, Pharmacy: [address] 60 Cap 0    rosuvastatin (CRESTOR) 40 mg tablet TAKE 1 TABLET BY MOUTH ONCE DAILY 60 Tab 0       Allergies   Allergen Reactions    Jardiance [Empagliflozin] Nausea Only     HYPOTENSION    Metformin Diarrhea             SOCIAL HISTORY:     Social History     Tobacco Use    Smoking status: Never Smoker    Smokeless tobacco: Never Used   Substance Use Topics    Alcohol use: Not Currently    Drug use: Never       FAMILY HISTORY:     Family History   Problem Relation Age of Onset    Heart Attack Mother              REVIEW OF SYMPTOMS:     Review of Symptoms:  Constitutional: Negative for fever, chills  HEENT: Negative for nosebleeds, tinnitus, and vision changes. Respiratory: Negative for cough, wheezing  Cardiovascular: Negative for orthopnea, claudication, syncope, and PND. Gastrointestinal: Negative for abdominal pain, diarrhea, melena. Genitourinary: Negative for dysuria  Musculoskeletal: + arthritis   Skin: Negative for rash  Heme: No problems bleeding. Neurological: Negative for speech change and focal weakness. PHYSICAL EXAM:     Physical Exam:  Visit Vitals  /60 (BP 1 Location: Right upper arm, BP Patient Position: Sitting, BP Cuff Size: Adult)   Pulse 60   Ht 5' 4\" (1.626 m)   Wt 150 lb (68 kg)   SpO2 96%   BMI 25.75 kg/m²     Patient appears generally well, mood and affect are appropriate and pleasant. HEENT:  Hearing intact, non-icteric, normocephalic, atraumatic. Neck Exam: Supple, No JVD or carotid bruits. Lung Exam: Clear to auscultation, even breath sounds. Cardiac Exam: Regular rate and rhythm with 2/6 systolic murmur  Abdomen: Soft, non-tender   Extremities: Moves all ext well. No lower extremity edema.   MSKTL: Overall good ROM ext  Skin: No significant rashes  Psych: Appropriate affect  Neuro - Grossly intact      LABS / OTHER STUDIES reviewed:       Lab Results   Component Value Date/Time    Sodium 139 11/20/2021 02:36 PM    Potassium 3.6 11/20/2021 02:36 PM    Chloride 109 (H) 11/20/2021 02:36 PM    CO2 28 11/20/2021 02:36 PM    Anion gap 2 (L) 11/20/2021 02:36 PM    Glucose 260 (H) 11/20/2021 02:36 PM    BUN 20 11/20/2021 02:36 PM    Creatinine 0.94 11/20/2021 02:36 PM    BUN/Creatinine ratio 21 (H) 11/20/2021 02:36 PM    GFR est AA >60 11/20/2021 02:36 PM    GFR est non-AA 58 (L) 11/20/2021 02:36 PM    Calcium 9.3 11/20/2021 02:36 PM    Bilirubin, total 0.3 11/20/2021 02:36 PM    Alk. phosphatase 91 11/20/2021 02:36 PM    Protein, total 7.2 11/20/2021 02:36 PM    Albumin 3.6 11/20/2021 02:36 PM    Globulin 3.6 11/20/2021 02:36 PM    A-G Ratio 1.0 (L) 11/20/2021 02:36 PM    ALT (SGPT) 15 11/20/2021 02:36 PM    AST (SGOT) 17 11/20/2021 02:36 PM     Lab Results   Component Value Date/Time    WBC 8.5 11/20/2021 02:36 PM    HGB 12.8 11/20/2021 02:36 PM    HCT 39.8 11/20/2021 02:36 PM    PLATELET 702 97/85/3004 02:36 PM    MCV 83.8 11/20/2021 02:36 PM       Lab Results   Component Value Date/Time    Cholesterol, total 149 01/15/2021 09:19 AM    HDL Cholesterol 53 01/15/2021 09:19 AM    LDL, calculated 83.8 01/15/2021 09:19 AM    VLDL, calculated 12.2 01/15/2021 09:19 AM    Triglyceride 61 01/15/2021 09:19 AM    CHOL/HDL Ratio 2.8 01/15/2021 09:19 AM       Lab Results   Component Value Date/Time    TSH 1.51 02/16/2021 11:00 AM     Lab Results   Component Value Date/Time    Hemoglobin A1c 7.6 (H) 09/07/2021 02:40 PM             CARDIAC DIAGNOSTICS:     Cardiac Evaluation Includes:  I reviewed the results below. She was seen at [facility] for CAD (s/p CABG; 2 stents to LCX in 2009)     CABG 5/2005 --> LIMA to LAD, SVG to Cx and PDA    Echo 6/17 - LVEF 50-55%    Lexiscan Cardiolite 8/14/18 - small sized (4%) reversible inferoseptal defect.   LVEF 67%    Chest CTA 18 - no evidence acute traumatic injury. Right sided aortic arch. Cardiac Cath ([facility]) 10/18 - patent grafts (LIMA to LAD, SVG-T to PDA and OM1) - treated medically - if persistent symptoms then consider PCI to pLAD and mLCX    Echo 10/24/21 - LVEF 60%, mild LAE       EKG 2/10/21 - NSR, old inferior infarct - I viewed myself   EKG 21 - sinus roscoe, inferior infarct         ASSESSMENT AND PLAN:     Assessment and Plan:  1) CAD  - History of CABG (was managed at [facility] for years)   - Cardiac Cath ([facility]) 10/18 - patent grafts (LIMA to LAD, SVG-T to PDA and OM1) - treated medically - if persistent symptoms then consider PCI to pLAD and mLCX  - Echo 10/24/21 - LVEF 60%  - She feels well overall without significant angina.    - On  - She had ED visit with dehydration and mild CP   --> I offered checking a Lexiscan Cardiolite and we decided to check a stress test later if she has more complaints (she does not feel there is a problem currently)   - cont statin, ASA, CCB, Imdur        2) HTN  - BP OK - cont meds     3) Dyslipidemia   - recent LDL OK   - cont statin     4) See me back in 6 months (her preference)     Lives with  (COPD). Has 3 kids. She was a . Jere Wheeler MD, [address]  [address], [address]  Ph: [phone]   Ph [phone] "I'm having side effects and I was unable to reach my psychiatrist"